# Patient Record
Sex: MALE | Race: WHITE | Employment: OTHER | URBAN - NONMETROPOLITAN AREA
[De-identification: names, ages, dates, MRNs, and addresses within clinical notes are randomized per-mention and may not be internally consistent; named-entity substitution may affect disease eponyms.]

---

## 2017-08-07 ENCOUNTER — TELEPHONE (OUTPATIENT)
Dept: CARDIOLOGY | Age: 62
End: 2017-08-07

## 2017-09-07 RX ORDER — CLINDAMYCIN HYDROCHLORIDE 300 MG/1
300 CAPSULE ORAL 3 TIMES DAILY
COMMUNITY
End: 2017-09-12 | Stop reason: ALTCHOICE

## 2017-09-12 ENCOUNTER — OFFICE VISIT (OUTPATIENT)
Dept: CARDIOLOGY | Age: 62
End: 2017-09-12
Payer: OTHER GOVERNMENT

## 2017-09-12 VITALS
HEIGHT: 73 IN | HEART RATE: 49 BPM | DIASTOLIC BLOOD PRESSURE: 80 MMHG | WEIGHT: 214 LBS | BODY MASS INDEX: 28.36 KG/M2 | SYSTOLIC BLOOD PRESSURE: 130 MMHG

## 2017-09-12 DIAGNOSIS — Q23.1 BICUSPID AORTIC VALVE: ICD-10-CM

## 2017-09-12 DIAGNOSIS — I10 ESSENTIAL HYPERTENSION: ICD-10-CM

## 2017-09-12 DIAGNOSIS — Z95.2 S/P AVR (AORTIC VALVE REPLACEMENT): Primary | ICD-10-CM

## 2017-09-12 PROCEDURE — 93000 ELECTROCARDIOGRAM COMPLETE: CPT | Performed by: CLINICAL NURSE SPECIALIST

## 2017-09-12 PROCEDURE — 99203 OFFICE O/P NEW LOW 30 MIN: CPT | Performed by: CLINICAL NURSE SPECIALIST

## 2017-09-25 ENCOUNTER — HOSPITAL ENCOUNTER (OUTPATIENT)
Dept: NON INVASIVE DIAGNOSTICS | Age: 62
Discharge: HOME | End: 2017-09-25
Payer: OTHER GOVERNMENT

## 2017-09-25 DIAGNOSIS — Q23.1 BICUSPID AORTIC VALVE: ICD-10-CM

## 2017-09-25 DIAGNOSIS — I10 ESSENTIAL HYPERTENSION: ICD-10-CM

## 2017-09-25 DIAGNOSIS — Z95.2 S/P AVR (AORTIC VALVE REPLACEMENT): ICD-10-CM

## 2017-09-25 LAB
LV EF: 58 %
LVEF MODALITY: NORMAL

## 2017-09-25 PROCEDURE — 93306 TTE W/DOPPLER COMPLETE: CPT

## 2018-03-06 ENCOUNTER — OFFICE VISIT (OUTPATIENT)
Dept: CARDIOLOGY | Age: 63
End: 2018-03-06
Payer: OTHER GOVERNMENT

## 2018-03-06 VITALS
BODY MASS INDEX: 27.83 KG/M2 | WEIGHT: 210 LBS | HEART RATE: 84 BPM | SYSTOLIC BLOOD PRESSURE: 110 MMHG | HEIGHT: 73 IN | DIASTOLIC BLOOD PRESSURE: 70 MMHG

## 2018-03-06 DIAGNOSIS — Q23.1 BICUSPID AORTIC VALVE: Primary | ICD-10-CM

## 2018-03-06 DIAGNOSIS — I48.91 NEW ONSET A-FIB (HCC): ICD-10-CM

## 2018-03-06 DIAGNOSIS — I10 ESSENTIAL HYPERTENSION: ICD-10-CM

## 2018-03-06 PROCEDURE — 93000 ELECTROCARDIOGRAM COMPLETE: CPT | Performed by: INTERNAL MEDICINE

## 2018-03-06 PROCEDURE — 99213 OFFICE O/P EST LOW 20 MIN: CPT | Performed by: INTERNAL MEDICINE

## 2018-03-06 NOTE — PROGRESS NOTES
Description if Yes       Fatigue No   Weight gain N/A   Insomnia N/A       Respiratory:        Complaint / Symptom Yes / No / Description if Yes       Cough No   Horseness N/A       Cardiovascular:    Complaint / Symptom Yes / No / Description if Yes       Chest Pain No   Shortness of Air / Orthopnea No   Presyncope / Syncope No   Palpitations No         Objective:    /70   Pulse 68   Ht 6' 1\" (1.854 m)   Wt 210 lb (95.3 kg)   BMI 27.71 kg/m²     GENERAL - well developed and well nourished, conversant  HEENT   PERRLA, Hearing appears normal  NECK - no thyromegaly, no JVD, trachea is in the midline  CARDIOVASCULAR  PMI is in the mid line clavicular position, Normal S1 and S2 with a grade 1/6 systolic murmur. No S3 or S4    PULMONARY  no respiratory distress. No wheezes or rales. Lungs are clear to ausculation   ABDOMEN   soft, non tender, no rebound  MUSCULOSKELETAL   range of motion of the upper and lower extermites appears normal and equal and is without pain   EXTREMITIES - no significant edema   NEUROLOGIC  gait and station are normal  SKIN - turgor is normal  PSYCHIATRIC - normal mood and affect, alert and orientated x 3,      ASSESSMENT:    ALL THE CARDIOLOGY PROBLEMS ARE LISTED ABOVE; HOWEVER, THE FOLLOWING SPECIFIC CARDIAC PROBLEMS / CONDITIONS WERE ADDRESSED AND TREATED DURING THE OFFICE VISIT TODAY:                                                                                            MEDICAL DECISION MAKING             Cardiac Specific Problem / Diagnosis  Discussion and Data Reviewed Diagnostic Procedures Ordered Management Options Selected           1. Bicuspid aortic valve  show no change   Review and summation of old records:Status post AVR in 2013 9/25/17 Echo normal LVFX, EF 55-60% No Continue current medications:     Yes           2. HTN  show no change   Well controlled at home.    Patient has a history of these risk factors, which are managed medically, and are on current

## 2018-04-06 ENCOUNTER — TELEPHONE (OUTPATIENT)
Dept: CARDIOLOGY | Age: 63
End: 2018-04-06

## 2018-04-19 ENCOUNTER — OFFICE VISIT (OUTPATIENT)
Dept: CARDIOLOGY | Age: 63
End: 2018-04-19
Payer: OTHER GOVERNMENT

## 2018-04-19 VITALS
WEIGHT: 215 LBS | HEIGHT: 73 IN | HEART RATE: 95 BPM | SYSTOLIC BLOOD PRESSURE: 120 MMHG | BODY MASS INDEX: 28.49 KG/M2 | DIASTOLIC BLOOD PRESSURE: 82 MMHG

## 2018-04-19 DIAGNOSIS — Z95.2 S/P AVR (AORTIC VALVE REPLACEMENT): ICD-10-CM

## 2018-04-19 DIAGNOSIS — I48.19 PERSISTENT ATRIAL FIBRILLATION (HCC): Primary | ICD-10-CM

## 2018-04-19 DIAGNOSIS — Z79.01 CHRONIC ANTICOAGULATION: ICD-10-CM

## 2018-04-19 DIAGNOSIS — I10 ESSENTIAL HYPERTENSION: ICD-10-CM

## 2018-04-19 PROCEDURE — 93000 ELECTROCARDIOGRAM COMPLETE: CPT | Performed by: NURSE PRACTITIONER

## 2018-04-19 PROCEDURE — 99214 OFFICE O/P EST MOD 30 MIN: CPT | Performed by: NURSE PRACTITIONER

## 2018-04-23 ENCOUNTER — HOSPITAL ENCOUNTER (INPATIENT)
Age: 63
LOS: 2 days | Discharge: HOME | DRG: 310 | End: 2018-04-25
Attending: INTERNAL MEDICINE | Admitting: INTERNAL MEDICINE
Payer: OTHER GOVERNMENT

## 2018-04-23 PROBLEM — I48.19 PERSISTENT ATRIAL FIBRILLATION (HCC): Status: ACTIVE | Noted: 2018-04-23

## 2018-04-23 PROCEDURE — 2140000000 HC CCU INTERMEDIATE R&B

## 2018-04-23 PROCEDURE — 6370000000 HC RX 637 (ALT 250 FOR IP): Performed by: INTERNAL MEDICINE

## 2018-04-23 PROCEDURE — 93005 ELECTROCARDIOGRAM TRACING: CPT

## 2018-04-23 RX ORDER — ASPIRIN 81 MG/1
81 TABLET, CHEWABLE ORAL DAILY
Status: DISCONTINUED | OUTPATIENT
Start: 2018-04-23 | End: 2018-04-25 | Stop reason: HOSPADM

## 2018-04-23 RX ORDER — SOTALOL HYDROCHLORIDE 80 MG/1
80 TABLET ORAL 2 TIMES DAILY
Status: DISCONTINUED | OUTPATIENT
Start: 2018-04-23 | End: 2018-04-25 | Stop reason: HOSPADM

## 2018-04-23 RX ADMIN — ASPIRIN 81 MG CHEWABLE TABLET 81 MG: 81 TABLET CHEWABLE at 20:54

## 2018-04-23 RX ADMIN — SOTALOL HYDROCHLORIDE 80 MG: 80 TABLET ORAL at 20:52

## 2018-04-23 RX ADMIN — SOTALOL HYDROCHLORIDE 80 MG: 80 TABLET ORAL at 12:51

## 2018-04-23 ASSESSMENT — PAIN SCALES - GENERAL
PAINLEVEL_OUTOF10: 0

## 2018-04-24 LAB
EKG P AXIS: NORMAL DEGREES
EKG P-R INTERVAL: NORMAL MS
EKG Q-T INTERVAL: 412 MS
EKG QRS DURATION: 96 MS
EKG QTC CALCULATION (BAZETT): 445 MS
EKG T AXIS: 42 DEGREES

## 2018-04-24 PROCEDURE — 2140000000 HC CCU INTERMEDIATE R&B

## 2018-04-24 PROCEDURE — 99233 SBSQ HOSP IP/OBS HIGH 50: CPT | Performed by: INTERNAL MEDICINE

## 2018-04-24 PROCEDURE — 6370000000 HC RX 637 (ALT 250 FOR IP): Performed by: INTERNAL MEDICINE

## 2018-04-24 RX ORDER — SODIUM CHLORIDE 0.9 % (FLUSH) 0.9 %
10 SYRINGE (ML) INJECTION EVERY 12 HOURS SCHEDULED
Status: DISCONTINUED | OUTPATIENT
Start: 2018-04-25 | End: 2018-04-25 | Stop reason: HOSPADM

## 2018-04-24 RX ORDER — SODIUM CHLORIDE 0.9 % (FLUSH) 0.9 %
10 SYRINGE (ML) INJECTION PRN
Status: DISCONTINUED | OUTPATIENT
Start: 2018-04-24 | End: 2018-04-25 | Stop reason: HOSPADM

## 2018-04-24 RX ORDER — SODIUM CHLORIDE 9 MG/ML
INJECTION, SOLUTION INTRAVENOUS CONTINUOUS
Status: DISCONTINUED | OUTPATIENT
Start: 2018-04-25 | End: 2018-04-25 | Stop reason: HOSPADM

## 2018-04-24 RX ADMIN — ASPIRIN 81 MG CHEWABLE TABLET 81 MG: 81 TABLET CHEWABLE at 09:26

## 2018-04-24 RX ADMIN — SOTALOL HYDROCHLORIDE 80 MG: 80 TABLET ORAL at 09:25

## 2018-04-24 RX ADMIN — SOTALOL HYDROCHLORIDE 80 MG: 80 TABLET ORAL at 21:33

## 2018-04-24 ASSESSMENT — PAIN SCALES - GENERAL
PAINLEVEL_OUTOF10: 0

## 2018-04-25 VITALS
RESPIRATION RATE: 16 BRPM | OXYGEN SATURATION: 96 % | DIASTOLIC BLOOD PRESSURE: 64 MMHG | SYSTOLIC BLOOD PRESSURE: 105 MMHG | WEIGHT: 212.4 LBS | HEART RATE: 52 BPM | BODY MASS INDEX: 28.15 KG/M2 | TEMPERATURE: 97 F | HEIGHT: 73 IN

## 2018-04-25 LAB
EKG P AXIS: 49 DEGREES
EKG P-R INTERVAL: 178 MS
EKG Q-T INTERVAL: 472 MS
EKG QRS DURATION: 92 MS
EKG QTC CALCULATION (BAZETT): 457 MS
EKG T AXIS: 6 DEGREES
INR BLD: 1.41 (ref 0.88–1.18)
PROTHROMBIN TIME: 17.2 SEC (ref 12–14.6)

## 2018-04-25 PROCEDURE — 36415 COLL VENOUS BLD VENIPUNCTURE: CPT

## 2018-04-25 PROCEDURE — 93005 ELECTROCARDIOGRAM TRACING: CPT

## 2018-04-25 PROCEDURE — 6370000000 HC RX 637 (ALT 250 FOR IP): Performed by: INTERNAL MEDICINE

## 2018-04-25 PROCEDURE — 92960 CARDIOVERSION ELECTRIC EXT: CPT | Performed by: INTERNAL MEDICINE

## 2018-04-25 PROCEDURE — 85610 PROTHROMBIN TIME: CPT

## 2018-04-25 PROCEDURE — 99024 POSTOP FOLLOW-UP VISIT: CPT | Performed by: INTERNAL MEDICINE

## 2018-04-25 PROCEDURE — 2580000003 HC RX 258: Performed by: INTERNAL MEDICINE

## 2018-04-25 PROCEDURE — 6360000002 HC RX W HCPCS

## 2018-04-25 PROCEDURE — 5A2204Z RESTORATION OF CARDIAC RHYTHM, SINGLE: ICD-10-PCS | Performed by: INTERNAL MEDICINE

## 2018-04-25 RX ORDER — SODIUM CHLORIDE 0.9 % (FLUSH) 0.9 %
10 SYRINGE (ML) INJECTION EVERY 12 HOURS SCHEDULED
Status: DISCONTINUED | OUTPATIENT
Start: 2018-04-25 | End: 2018-04-25 | Stop reason: HOSPADM

## 2018-04-25 RX ORDER — SOTALOL HYDROCHLORIDE 80 MG/1
80 TABLET ORAL 2 TIMES DAILY
Qty: 60 TABLET | Refills: 3 | Status: SHIPPED | OUTPATIENT
Start: 2018-04-25 | End: 2018-05-02 | Stop reason: SDUPTHER

## 2018-04-25 RX ORDER — SODIUM CHLORIDE 0.9 % (FLUSH) 0.9 %
10 SYRINGE (ML) INJECTION PRN
Status: DISCONTINUED | OUTPATIENT
Start: 2018-04-25 | End: 2018-04-25 | Stop reason: HOSPADM

## 2018-04-25 RX ADMIN — ASPIRIN 81 MG CHEWABLE TABLET 81 MG: 81 TABLET CHEWABLE at 08:46

## 2018-04-25 RX ADMIN — SODIUM CHLORIDE: 9 INJECTION, SOLUTION INTRAVENOUS at 06:41

## 2018-04-25 RX ADMIN — SOTALOL HYDROCHLORIDE 80 MG: 80 TABLET ORAL at 08:46

## 2018-04-25 ASSESSMENT — PAIN SCALES - GENERAL
PAINLEVEL_OUTOF10: 0

## 2018-05-03 RX ORDER — SOTALOL HYDROCHLORIDE 80 MG/1
80 TABLET ORAL 2 TIMES DAILY
Qty: 180 TABLET | Refills: 3 | Status: SHIPPED | OUTPATIENT
Start: 2018-05-03 | End: 2018-05-29 | Stop reason: SDUPTHER

## 2018-05-04 ENCOUNTER — OFFICE VISIT (OUTPATIENT)
Dept: CARDIOLOGY | Age: 63
End: 2018-05-04
Payer: OTHER GOVERNMENT

## 2018-05-04 VITALS
HEART RATE: 52 BPM | DIASTOLIC BLOOD PRESSURE: 68 MMHG | WEIGHT: 215 LBS | HEIGHT: 73 IN | SYSTOLIC BLOOD PRESSURE: 122 MMHG | BODY MASS INDEX: 28.49 KG/M2

## 2018-05-04 DIAGNOSIS — I48.19 PERSISTENT ATRIAL FIBRILLATION (HCC): Primary | ICD-10-CM

## 2018-05-04 PROCEDURE — 93000 ELECTROCARDIOGRAM COMPLETE: CPT | Performed by: CLINICAL NURSE SPECIALIST

## 2018-05-29 ENCOUNTER — OFFICE VISIT (OUTPATIENT)
Dept: CARDIOLOGY | Age: 63
End: 2018-05-29
Payer: OTHER GOVERNMENT

## 2018-05-29 VITALS
SYSTOLIC BLOOD PRESSURE: 128 MMHG | WEIGHT: 219 LBS | HEART RATE: 78 BPM | BODY MASS INDEX: 29.03 KG/M2 | DIASTOLIC BLOOD PRESSURE: 80 MMHG | HEIGHT: 73 IN

## 2018-05-29 DIAGNOSIS — Z95.2 S/P AVR: ICD-10-CM

## 2018-05-29 DIAGNOSIS — I48.19 PERSISTENT ATRIAL FIBRILLATION (HCC): Primary | ICD-10-CM

## 2018-05-29 DIAGNOSIS — I10 ESSENTIAL HYPERTENSION: ICD-10-CM

## 2018-05-29 PROCEDURE — 93000 ELECTROCARDIOGRAM COMPLETE: CPT | Performed by: CLINICAL NURSE SPECIALIST

## 2018-05-29 PROCEDURE — 99213 OFFICE O/P EST LOW 20 MIN: CPT | Performed by: CLINICAL NURSE SPECIALIST

## 2018-05-29 RX ORDER — SOTALOL HYDROCHLORIDE 80 MG/1
120 TABLET ORAL 2 TIMES DAILY
Qty: 180 TABLET | Refills: 3 | Status: SHIPPED | OUTPATIENT
Start: 2018-05-29 | End: 2018-06-20 | Stop reason: DRUGHIGH

## 2018-06-04 ENCOUNTER — OFFICE VISIT (OUTPATIENT)
Dept: CARDIOLOGY | Age: 63
End: 2018-06-04
Payer: OTHER GOVERNMENT

## 2018-06-04 VITALS
HEART RATE: 65 BPM | BODY MASS INDEX: 29.69 KG/M2 | SYSTOLIC BLOOD PRESSURE: 118 MMHG | WEIGHT: 224 LBS | DIASTOLIC BLOOD PRESSURE: 70 MMHG | HEIGHT: 73 IN

## 2018-06-04 DIAGNOSIS — I48.19 PERSISTENT ATRIAL FIBRILLATION (HCC): ICD-10-CM

## 2018-06-04 DIAGNOSIS — I48.19 PERSISTENT ATRIAL FIBRILLATION (HCC): Primary | ICD-10-CM

## 2018-06-04 DIAGNOSIS — Z95.2 S/P AVR: ICD-10-CM

## 2018-06-04 DIAGNOSIS — I10 ESSENTIAL HYPERTENSION: ICD-10-CM

## 2018-06-04 DIAGNOSIS — Q23.1 BICUSPID AORTIC VALVE: ICD-10-CM

## 2018-06-04 LAB
ANION GAP SERPL CALCULATED.3IONS-SCNC: 17 MMOL/L (ref 7–19)
BUN BLDV-MCNC: 17 MG/DL (ref 8–23)
CALCIUM SERPL-MCNC: 9.2 MG/DL (ref 8.8–10.2)
CHLORIDE BLD-SCNC: 101 MMOL/L (ref 98–111)
CO2: 24 MMOL/L (ref 22–29)
CREAT SERPL-MCNC: 0.8 MG/DL (ref 0.5–1.2)
GFR NON-AFRICAN AMERICAN: >60
GLUCOSE BLD-MCNC: 86 MG/DL (ref 74–109)
POTASSIUM SERPL-SCNC: 4.2 MMOL/L (ref 3.5–5)
SODIUM BLD-SCNC: 142 MMOL/L (ref 136–145)

## 2018-06-04 PROCEDURE — 93000 ELECTROCARDIOGRAM COMPLETE: CPT | Performed by: CLINICAL NURSE SPECIALIST

## 2018-06-04 PROCEDURE — 99213 OFFICE O/P EST LOW 20 MIN: CPT | Performed by: CLINICAL NURSE SPECIALIST

## 2018-06-04 ASSESSMENT — ENCOUNTER SYMPTOMS
SHORTNESS OF BREATH: 0
BLURRED VISION: 0
VOMITING: 0
ORTHOPNEA: 0
COUGH: 0
HEARTBURN: 0
NAUSEA: 0

## 2018-06-08 ENCOUNTER — TELEPHONE (OUTPATIENT)
Dept: CARDIOLOGY | Age: 63
End: 2018-06-08

## 2018-06-08 ENCOUNTER — HOSPITAL ENCOUNTER (OUTPATIENT)
Dept: CARDIAC CATH/INVASIVE PROCEDURES | Age: 63
Discharge: HOME | End: 2018-06-08
Attending: INTERNAL MEDICINE | Admitting: INTERNAL MEDICINE
Payer: OTHER GOVERNMENT

## 2018-06-08 VITALS
RESPIRATION RATE: 13 BRPM | SYSTOLIC BLOOD PRESSURE: 110 MMHG | WEIGHT: 215 LBS | HEART RATE: 50 BPM | HEIGHT: 73 IN | OXYGEN SATURATION: 98 % | DIASTOLIC BLOOD PRESSURE: 67 MMHG | TEMPERATURE: 98 F | BODY MASS INDEX: 28.49 KG/M2

## 2018-06-08 PROCEDURE — 99024 POSTOP FOLLOW-UP VISIT: CPT | Performed by: INTERNAL MEDICINE

## 2018-06-08 PROCEDURE — 92960 CARDIOVERSION ELECTRIC EXT: CPT | Performed by: INTERNAL MEDICINE

## 2018-06-08 PROCEDURE — 93005 ELECTROCARDIOGRAM TRACING: CPT

## 2018-06-08 PROCEDURE — 2580000003 HC RX 258: Performed by: INTERNAL MEDICINE

## 2018-06-08 PROCEDURE — 6360000002 HC RX W HCPCS

## 2018-06-08 RX ORDER — SODIUM CHLORIDE 0.9 % (FLUSH) 0.9 %
10 SYRINGE (ML) INJECTION PRN
Status: CANCELLED | OUTPATIENT
Start: 2018-06-08

## 2018-06-08 RX ORDER — SODIUM CHLORIDE 9 MG/ML
INJECTION, SOLUTION INTRAVENOUS CONTINUOUS
Status: DISCONTINUED | OUTPATIENT
Start: 2018-06-08 | End: 2018-06-08 | Stop reason: HOSPADM

## 2018-06-08 RX ORDER — SODIUM CHLORIDE 0.9 % (FLUSH) 0.9 %
10 SYRINGE (ML) INJECTION EVERY 12 HOURS SCHEDULED
Status: CANCELLED | OUTPATIENT
Start: 2018-06-08

## 2018-06-08 RX ADMIN — SODIUM CHLORIDE: 9 INJECTION, SOLUTION INTRAVENOUS at 08:07

## 2018-06-09 LAB
EKG P AXIS: 47 DEGREES
EKG P AXIS: NORMAL DEGREES
EKG P-R INTERVAL: 182 MS
EKG P-R INTERVAL: NORMAL MS
EKG Q-T INTERVAL: 448 MS
EKG Q-T INTERVAL: 500 MS
EKG QRS DURATION: 92 MS
EKG QRS DURATION: 98 MS
EKG QTC CALCULATION (BAZETT): 448 MS
EKG QTC CALCULATION (BAZETT): 479 MS
EKG T AXIS: 28 DEGREES
EKG T AXIS: 36 DEGREES

## 2018-06-20 ENCOUNTER — OFFICE VISIT (OUTPATIENT)
Dept: CARDIOLOGY | Age: 63
End: 2018-06-20
Payer: OTHER GOVERNMENT

## 2018-06-20 VITALS
SYSTOLIC BLOOD PRESSURE: 124 MMHG | HEIGHT: 73 IN | DIASTOLIC BLOOD PRESSURE: 84 MMHG | WEIGHT: 221 LBS | BODY MASS INDEX: 29.29 KG/M2 | HEART RATE: 78 BPM

## 2018-06-20 DIAGNOSIS — I48.19 PERSISTENT ATRIAL FIBRILLATION (HCC): ICD-10-CM

## 2018-06-20 DIAGNOSIS — I48.91 ATRIAL FIBRILLATION, UNSPECIFIED TYPE (HCC): Primary | ICD-10-CM

## 2018-06-20 DIAGNOSIS — Z79.01 CHRONIC ANTICOAGULATION: ICD-10-CM

## 2018-06-20 DIAGNOSIS — Z95.2 S/P AVR: ICD-10-CM

## 2018-06-20 PROCEDURE — 93000 ELECTROCARDIOGRAM COMPLETE: CPT | Performed by: NURSE PRACTITIONER

## 2018-06-20 PROCEDURE — 99214 OFFICE O/P EST MOD 30 MIN: CPT | Performed by: NURSE PRACTITIONER

## 2018-06-20 RX ORDER — SOTALOL HYDROCHLORIDE 80 MG/1
120 TABLET ORAL 2 TIMES DAILY
Qty: 180 TABLET | Refills: 3 | Status: SHIPPED | OUTPATIENT
Start: 2018-06-20 | End: 2018-06-25 | Stop reason: DRUGHIGH

## 2018-06-20 RX ORDER — SOTALOL HYDROCHLORIDE 160 MG/1
160 TABLET ORAL 2 TIMES DAILY
Qty: 60 TABLET | Refills: 5 | Status: SHIPPED | OUTPATIENT
Start: 2018-06-20 | End: 2018-06-20

## 2018-06-25 ENCOUNTER — OFFICE VISIT (OUTPATIENT)
Dept: CARDIOLOGY | Age: 63
End: 2018-06-25
Payer: OTHER GOVERNMENT

## 2018-06-25 VITALS
WEIGHT: 221 LBS | SYSTOLIC BLOOD PRESSURE: 106 MMHG | BODY MASS INDEX: 29.29 KG/M2 | HEIGHT: 73 IN | DIASTOLIC BLOOD PRESSURE: 72 MMHG | HEART RATE: 78 BPM

## 2018-06-25 DIAGNOSIS — Z79.01 CHRONIC ANTICOAGULATION: ICD-10-CM

## 2018-06-25 DIAGNOSIS — I48.19 PERSISTENT ATRIAL FIBRILLATION (HCC): ICD-10-CM

## 2018-06-25 DIAGNOSIS — I48.19 PERSISTENT ATRIAL FIBRILLATION (HCC): Primary | ICD-10-CM

## 2018-06-25 DIAGNOSIS — Z95.2 S/P AVR: ICD-10-CM

## 2018-06-25 LAB
ANION GAP SERPL CALCULATED.3IONS-SCNC: 14 MMOL/L (ref 7–19)
BUN BLDV-MCNC: 18 MG/DL (ref 8–23)
CALCIUM SERPL-MCNC: 9.4 MG/DL (ref 8.8–10.2)
CHLORIDE BLD-SCNC: 102 MMOL/L (ref 98–111)
CO2: 25 MMOL/L (ref 22–29)
CREAT SERPL-MCNC: 0.9 MG/DL (ref 0.5–1.2)
GFR NON-AFRICAN AMERICAN: >60
GLUCOSE BLD-MCNC: 96 MG/DL (ref 74–109)
POTASSIUM SERPL-SCNC: 4.4 MMOL/L (ref 3.5–5)
SODIUM BLD-SCNC: 141 MMOL/L (ref 136–145)

## 2018-06-25 PROCEDURE — 99214 OFFICE O/P EST MOD 30 MIN: CPT | Performed by: NURSE PRACTITIONER

## 2018-06-25 PROCEDURE — 93000 ELECTROCARDIOGRAM COMPLETE: CPT | Performed by: NURSE PRACTITIONER

## 2018-06-25 RX ORDER — SOTALOL HYDROCHLORIDE 80 MG/1
160 TABLET ORAL 2 TIMES DAILY
COMMUNITY
End: 2018-07-24 | Stop reason: DRUGHIGH

## 2018-07-02 ENCOUNTER — HOSPITAL ENCOUNTER (OUTPATIENT)
Dept: CARDIAC CATH/INVASIVE PROCEDURES | Age: 63
Discharge: HOME | End: 2018-07-02
Attending: INTERNAL MEDICINE | Admitting: INTERNAL MEDICINE
Payer: OTHER GOVERNMENT

## 2018-07-02 VITALS
DIASTOLIC BLOOD PRESSURE: 60 MMHG | TEMPERATURE: 98.1 F | WEIGHT: 221 LBS | HEIGHT: 73 IN | BODY MASS INDEX: 29.29 KG/M2 | RESPIRATION RATE: 14 BRPM | HEART RATE: 48 BPM | SYSTOLIC BLOOD PRESSURE: 108 MMHG | OXYGEN SATURATION: 98 %

## 2018-07-02 LAB
ANION GAP SERPL CALCULATED.3IONS-SCNC: 10 MMOL/L (ref 7–19)
BUN BLDV-MCNC: 22 MG/DL (ref 8–23)
CALCIUM SERPL-MCNC: 8.9 MG/DL (ref 8.8–10.2)
CHLORIDE BLD-SCNC: 107 MMOL/L (ref 98–111)
CO2: 26 MMOL/L (ref 22–29)
CREAT SERPL-MCNC: 0.9 MG/DL (ref 0.5–1.2)
GFR NON-AFRICAN AMERICAN: >60
GLUCOSE BLD-MCNC: 98 MG/DL (ref 74–109)
POTASSIUM SERPL-SCNC: 4.3 MMOL/L (ref 3.5–5)
SODIUM BLD-SCNC: 143 MMOL/L (ref 136–145)

## 2018-07-02 PROCEDURE — 92960 CARDIOVERSION ELECTRIC EXT: CPT | Performed by: INTERNAL MEDICINE

## 2018-07-02 PROCEDURE — 6360000002 HC RX W HCPCS

## 2018-07-02 PROCEDURE — 36415 COLL VENOUS BLD VENIPUNCTURE: CPT

## 2018-07-02 PROCEDURE — 93005 ELECTROCARDIOGRAM TRACING: CPT

## 2018-07-02 PROCEDURE — 2580000003 HC RX 258: Performed by: INTERNAL MEDICINE

## 2018-07-02 PROCEDURE — 99024 POSTOP FOLLOW-UP VISIT: CPT | Performed by: INTERNAL MEDICINE

## 2018-07-02 PROCEDURE — 80048 BASIC METABOLIC PNL TOTAL CA: CPT

## 2018-07-02 RX ORDER — SODIUM CHLORIDE 9 MG/ML
INJECTION, SOLUTION INTRAVENOUS CONTINUOUS
Status: DISCONTINUED | OUTPATIENT
Start: 2018-07-02 | End: 2018-07-03 | Stop reason: HOSPADM

## 2018-07-02 RX ADMIN — SODIUM CHLORIDE: 9 INJECTION, SOLUTION INTRAVENOUS at 11:08

## 2018-07-03 NOTE — H&P
ANNA Nieto Nurse Practitioner Signed   Progress Notes Encounter Date: 2018   Related encounter: Office Visit from 2018 in [facility]       Expand All Collapse All    []Manual[]Template  []Copied  [facility] [place], [place]. [place] [address] [address] [facility]  [phone] office  [phone] fax        OFFICE VISIT:  2018     Óscar Santana - : 1955     Reason For Visit:  Jose Barron is a 65 y.o. male who is here for Follow-up (Patient presents for EKG after Sotalol increased to 160 mg twice daily for recurrent AF.); Atrial Fibrillation; and Cardiac Valve Problem (Hx bicuspid AVR - s/p pericardial AVR)     The patient presents today for cardiology follow up regarding recurrent AF after DCCV on 18. The patient was seen last week and Sotalol was increased to 160 mg twice daily. He has tolerated increase without adverse effect. The patient is on Xarelto without bleeding issues. Overall, the patient is doing well from a cardiac standpoint without symptoms to suggest myocardial ischemia. BP is normal.  The patient's PCP monitors cholesterol.       Subjective  Jose Barron denies exertional chest pain, shortness of breath, orthopnea, paroxysmal nocturnal dyspnea, syncope, presyncope,  edema and fatigue. The patient denies numbness or weakness to suggest cerebrovascular accident or transient ischemic attack.   + arrhyhtmia.     Óscar Santana has the following history as recorded in [facility]:          Patient Active Problem List   Diagnosis Code    HTN (hypertension) I10    Bicuspid aortic valve Q23.1    Persistent atrial fibrillation (HCC) I48.1    S/P AVR Z95.2    Atrial fibrillation (HCC) I48.91    Regular pulse rhythm Z78.9    History of cardioversion Z98.890    Chronic anticoagulation Z79.01      Past Medical History        Past Medical History:   Diagnosis Date    Arrhythmia      orthopnea or PND. No occurrence of slow heart rate. No palpitations. No claudication. No leg edema. + arrhythmia. Gastrointestinal - no abdominal swelling or pain. No blood in stool. No severe constipation, diarrhea, nausea, or vomiting. Genitourinary - no dysuria, frequency, or urgency. No flank pain or hematuria. Musculoskeletal - no back pain or myalgia. No problems with gait. Extremities - no clubbing, cyanosis or edema. Skin - no color change or rash. No pallor. No new surgical incision. Neurologic - no speech difficulty, facial asymmetry or lateralizing weakness. No seizures, presyncope or syncope. No significant dizziness. Hematologic - no easy bruising or excessive bleeding. Psychiatric - no severe anxiety or insomnia. No confusion. All other review of systems are negative.     Objective  Vital Signs - /72   Pulse 78   Ht 6' 1\" (1.854 m)   Wt 221 lb (100.2 kg)   BMI 29.16 kg/m²   General - [facility] is alert, cooperative, and pleasant. Well groomed. No acute distress. Body habitus - Body mass index is 29.16 kg/m². HEENT - Head is normocephalic. No circumoral cyanosis. Dentition is normal.  EYES -   Lids normal without ptosis. No discharge, edema or subconjunctival hemorrhage. Neck - Symmetrical without apparent mass or lymphadenopathy. Respiratory - Normal respiratory effort without use of accessory muscles. Ausculatation reveals vesicular breath sounds without crackles, wheezes, rub or rhonchi. Cardiovascular - No jugular venous distention. Auscultation reveals irregularly irregular rate and rhythm. No audible clicks, gallop or rub. No murmur. No lower extremity varicosities. No carotid bruits. Abdominal -  No visible distention, mass or pulsations. Extremities - No clubbing or cyanosis. No statis dermatitis or ulcers. No edema. Musculoskeletal -   No Osler's nodes. No kyphosis or scoliosis. Gait is even and regular without limp or shuffle. Result Value Ref Range     P-R Interval 182 ms     QRS Duration 98 ms     Q-T Interval 500 ms     QTc Calculation (Bazett) 479 ms     P Axis 47 degrees     T Axis 36 degrees   Basic Metabolic Panel     Collection Time: 06/25/18 12:32 PM   Result Value Ref Range     Sodium 141 136 - 145 mmol/L     Potassium 4.4 3.5 - 5.0 mmol/L     Chloride 102 98 - 111 mmol/L     CO2 25 22 - 29 mmol/L     Anion Gap 14 7 - 19 mmol/L     Glucose 96 74 - 109 mg/dL     BUN 18 8 - 23 mg/dL     CREATININE 0.9 0.5 - 1.2 mg/dL     GFR Non- >60 >60     Calcium 9.4 8.8 - 10.2 mg/dL         Plan  Previous cardiac history and records reviewed. Continue current medications as prescribed. Outpatient DCCV scheduled. BMP today. Continue to follow up with primary care provider for non cardiac medical problems. Call the office with any problems, questions or concerns at [phone]. Follow up as scheduled with your cardiologist - as scheduled after cardioversion. The following educational material has been included in this after visit summary for your review: atrial fibrillation, cardioversion, Xarelto.     Additional instructions:  Xarelto can increase your risk of bleeding. If you notice blood in urine or stool, bleeding gums, excessive bruising or cough productive of bloody sputum, notify the office. Information on this blood thinner has been included in your after visit summary. Coronary artery disease risk factors you can control: Smoking, high blood pressure, high cholesterol, diabetes, being overweight, lack of exercise and stress. Continue heart healthy diet. Take medications as directed. Exercise as tolerated. Strive for 15 minutes of exercise most days of the week. If asked to keep a blood pressure log, do so for 2 weeks. Call the office to report readings at [phone]. Blood pressure goal is 140/90 or less. If you are a diabetic, the goal is 130/80 or less.   If you are taking cholesterol lowering medications, it is recommended that lab work be checked annually. Always keep a current medication list. Bring your medications to every office visit.      Out-Patient -  Cardioversion    [place] at the [facility] and [address] [facility] located on the first floor of [facility].     Date/Time: Monday July 2, 2018 - check in at 10:00 am.  Order:  BMP  (Basic metabolic profile). If on Coumadin patient will need a PT/INR. Cardioversion is a procedure that uses an electrical current to help normalize the heart rhythm. You will be coming into our facility as an outpatient and will go home following this procedure. Cardioversion Instructions:  · Do not eat or drink anything after midnight the night before your procedure. May take morning medications with a sip of water unless otherwise directed not to. · You should arrange to have someone take you home rather than drive yourself. · Further plans will depend upon the result of your procedure. If for any reason you are unable to keep this appointment, please contact [facility], [phone], as soon as possible to reschedule.      Ebenezer Block, ANNA          7/3/2018       Proposed Procedure:  DCCV    :  RAUL Fenton MD    Indications:  Recurrent AF    I have discussed the risks, benefits and options with the patient and his family. They appear to understand, have no questions, and wish to proceed. This procedure is scheduled for today.       Electronically signed by Navin Saldivar MD on 7/3/18

## 2018-07-03 NOTE — PROCEDURES
[facility]    Direct current cardioversion        7/3/2018     Indications:  Atrial fibrillation with an appropriate duration of anticoagulation    After obtaining informed written consent and an appropriate level of conscious sedation, DCCV with 360 J was successful in restoring sinus rhythm. Complications:  none      Impression:  Successful DC cardioversion of atrial fibrillation to normal sinus rhythm on Betapace 160 mg orally twice a day.     Electronically signed by Navin Saldivar MD on 7/3/18

## 2018-07-03 NOTE — DISCHARGE SUMMARY
[facility]    Discharge Summary      I personally saw the patient and rounded with:  Tami Yepez, nurse in cath holding      Patient ID: Valentin Langston      Patient's PCP: Johan Dolan    Admit Date: 7/2/2018     Discharge Date:  07/03/18     Admitting Physician:  Azul Petersen MD      Discharge Physician: Azul Petersen MD     Discharge Diagnoses:    1. Paroxymal atrial fibrillation, discharged in normal sinus rhythm after successful DCCV on Betapace 160 mg orally twice a day    4/25/18  DCCV successful on betapace 80 bid  6/8/18  DCCV successful on betapace 120 bid  7/2/2018  DCCV successful on betapace 160 bid    2. Valvular heart disease    Status post AVR in 2013 9/25/17 Echo normal LVFX, EF 55-60%    3. Hypertension        Cardiac Specific Diagnoses:    Specialty Problems        Cardiology Problems    Atrial fibrillation (HCC)        HTN (hypertension)        Bicuspid aortic valve        Persistent atrial fibrillation (HCC)                The patient was seen and examined on day of discharge and this discharge summary is in conjunction with any daily progress note from day of discharge. History of Present Illness: The patient was seen in the office on 6/25/2018 and the following was noted: \"Jatinder is a 65 y. o. male who is here for Follow-up (Patient presents for EKG after Sotalol increased to 160 mg twice daily for recurrent AF.); Atrial Fibrillation; and Cardiac Valve Problem (Hx bicuspid AVR - s/p pericardial AVR)     The patient presents today for cardiology follow up regarding recurrent AF after DCCV on 6/8/18.  The patient was seen last week and Sotalol was increased to 160 mg twice daily. He has tolerated increase without adverse effect.  The patient is on Xarelto without bleeding issues.  Overall, the patient is doing well from a cardiac standpoint without symptoms to suggest myocardial ischemia.  BP is normal.  The patient's PCP monitors cholesterol.       Subjective  Jatinder denies exertional chest pain, shortness of breath, orthopnea, paroxysmal nocturnal dyspnea, syncope, presyncope,  edema and fatigue.  The patient denies numbness or weakness to suggest cerebrovascular accident or transient ischemic attack.  + arrhyhtmia. \"    \"EKG reviewed:  NSR 78 BPM;  No acute ischemic changes; QTc .431. Patient scheduled for outpatient DCCV on 7/2/18 arrival time 10:00 am.  BMP checked today. Procedure instructions reviewed with understanding verbalized. Tolerating Sotalol 160 mg twice daily and Xarelto 20 mg daily with food. \"     He was electively admitted for DCCV. Hospital Course:     After admission, there was no enzymatic or electrocardiographic evidence of myocardial ischemia.      The patient was placed on Betapace 160 mg orally twice a day.  Xarelto was used for anticoagulation.      After an appropriate loading dose, and after obtaining informed written consent and an appropriate level of conscious sedation, DCCV with 360 J was successful in restoring sinus rhythm.      There were no apparent complications. The rest of the patient's hospitalization was uneventful. He was discharged home on medical therapy with outpatient follow up. Consults:     None      Significant Diagnostic Studies:    1. none    Significant Therapeutic Endeavors:      1. DCCV, 7/2/2018      Activity: activity as directed per discharge instructions. Diet:  Cardiac diet    Labs:  For convenience and continuity at follow-up the following most recent labs are provided:    CBC:  No results found for: WBC, HGB, HCT, PLT    Renal:    Lab Results   Component Value Date     07/02/2018    K 4.3 07/02/2018     07/02/2018    CO2 26 07/02/2018    BUN 22 07/02/2018    CREATININE 0.9 07/02/2018    CALCIUM 8.9 07/02/2018         Discharge Medications:     Discharge Medication List as of 7/2/2018  3:23 PM           Details   sotalol (BETAPACE) 80 MG tablet 160 mg 2 times daily Take 2 tablet twice daily Historical Med      aspirin 81 MG tablet Take 81 mg by mouth dailyHistorical Med      rivaroxaban (XARELTO) 20 MG TABS tablet Take 1 tablet by mouth Daily with supper, Disp-90 tablet, R-3Normal                 Disposition:      1. Home  2. Follow up with cardiology as arranged  3.  Follow up with primary care provider as arranged      Electronically signed by Maximilian Mcdonough MD on 7/3/18

## 2018-07-04 LAB
EKG P AXIS: 64 DEGREES
EKG P AXIS: NORMAL DEGREES
EKG P-R INTERVAL: 176 MS
EKG P-R INTERVAL: NORMAL MS
EKG Q-T INTERVAL: 496 MS
EKG Q-T INTERVAL: 528 MS
EKG QRS DURATION: 94 MS
EKG QRS DURATION: 94 MS
EKG QTC CALCULATION (BAZETT): 491 MS
EKG QTC CALCULATION (BAZETT): 504 MS
EKG T AXIS: -28 DEGREES
EKG T AXIS: 7 DEGREES

## 2018-07-24 ENCOUNTER — OFFICE VISIT (OUTPATIENT)
Dept: CARDIOLOGY | Age: 63
End: 2018-07-24
Payer: OTHER GOVERNMENT

## 2018-07-24 VITALS
WEIGHT: 226 LBS | HEART RATE: 48 BPM | BODY MASS INDEX: 29.95 KG/M2 | HEIGHT: 73 IN | DIASTOLIC BLOOD PRESSURE: 62 MMHG | SYSTOLIC BLOOD PRESSURE: 112 MMHG

## 2018-07-24 DIAGNOSIS — I10 ESSENTIAL HYPERTENSION: ICD-10-CM

## 2018-07-24 DIAGNOSIS — Q23.1 BICUSPID AORTIC VALVE: ICD-10-CM

## 2018-07-24 DIAGNOSIS — Z95.2 S/P AVR: ICD-10-CM

## 2018-07-24 DIAGNOSIS — I48.0 PAF (PAROXYSMAL ATRIAL FIBRILLATION) (HCC): Primary | ICD-10-CM

## 2018-07-24 DIAGNOSIS — R06.02 SHORTNESS OF BREATH: ICD-10-CM

## 2018-07-24 PROCEDURE — 99213 OFFICE O/P EST LOW 20 MIN: CPT | Performed by: CLINICAL NURSE SPECIALIST

## 2018-07-24 PROCEDURE — 93000 ELECTROCARDIOGRAM COMPLETE: CPT | Performed by: CLINICAL NURSE SPECIALIST

## 2018-07-24 RX ORDER — SOTALOL HYDROCHLORIDE 160 MG/1
160 TABLET ORAL 2 TIMES DAILY
COMMUNITY
End: 2018-10-16 | Stop reason: SDUPTHER

## 2018-07-24 RX ORDER — SOTALOL HYDROCHLORIDE 80 MG/1
80 TABLET ORAL 2 TIMES DAILY
COMMUNITY
End: 2018-07-24

## 2018-07-24 ASSESSMENT — ENCOUNTER SYMPTOMS
HEARTBURN: 0
BLOOD IN STOOL: 0
COUGH: 0
BLURRED VISION: 0
VOMITING: 0
SHORTNESS OF BREATH: 0
NAUSEA: 0
ORTHOPNEA: 0

## 2018-07-24 NOTE — PATIENT INSTRUCTIONS
[place] at the [address] [facility] and [address] [facility] located on the first floor of [place] [address] through hospital main entrance and turn immediately to your left. Patient's contact number:  [phone] (home)      Lexiscan Stress Test      Lexiscan (regadenoson injection) is a prescription drug given through an IV line that increases blood flow through the arteries of the heart during a cardiac nuclear stress test.     There are two parts to a Lexiscan stress test: the rest portion and the exercise portion. For the rest portion, a radioactive tracer is injected into your arm through the IV. After 30 to 60 minutes, the process of imaging will begin. A nuclear camera will be placed on your chest area and images are taken for the next 15 to 20 minutes. For the exercise portion, a nurse will attach EKG electrodes to your chest to monitor your heart rate. The drug Radha Hare is administered to simulate stress on the heart. Your heart rhythm will then be monitored for the next few minutes. Your blood pressure will also be monitored throughout the exercise portion. [place] through the exercise portion, a second round of radioactive tracer is injected into your body. Your heart rate and EKG will be monitored for another few minutes after administering the drug. Test Preparation:     Bring a list of your current medications. Do not take any of your medications the morning of the test, but bring all morning medications with you as you will take them after the stress portion of the test is completed.  Do not eat Bananas 24 hours prior to test.     No caffeine 24 hours prior to the testing. This includes: coffee, pop/soda, chocolate, cold medications, etc.  Any product that might contain caffeine.  No nicotine or alcohol 12 hours prior to your test.    Nothing to eat or drink 4-6 hours prior to appointment time.   It is okay to drink small amounts of water during the four hours prior to the test.   Nitroglycerin patches must be taken off 1 hour before testing.  Wear comfortable clothing.  Please refrain from any strenuous exercise or activities the day before your test, or the day of your test.   The Nuclear Lexiscan Stress test takes about 2 ½ to 3 hours to complete. If for any reason you are unable to keep this appointment, please contact Outpatient Scheduling, [phone], as soon as possible to reschedule.

## 2018-07-24 NOTE — PROGRESS NOTES
[facility], [address], [address]  [address]  Phone: [phone]  Fax: [phone]    OFFICE VISIT:  2018    Khang Wick Rushing - : 1955    Reason For Visit:  Errol Cowden is a 65 y.o. male who is here for hospital follow-up after recent cardioversion for atrial fibrillation    HPI   She is here for hospital follow-up after recent cardioversion. 18  DCCV successful on betapace 80 bid  18  DCCV successful on betapace 120 bid  2018  DCCV successful on betapace 160 bid  He is maintaining sinus rhythm. He has history of valvular heart disease the history of an aortic valve replacement. He states he is feeling well. He denies any chest pain, dyspnea, orthopnea, PND, edema, palpitations. There is no dizziness or lightheadedness with his lower heart rate    Meagan Barreto is PCP.   Josh Beth has the following history as [facility] in [facility]:    Patient Active Problem List    Diagnosis Date Noted    PAF (paroxysmal atrial fibrillation) ([facility]      Priority: High    Regular pulse rhythm      Priority: High    History of cardioversion      Priority: High    Chronic anticoagulation 2018    Persistent atrial fibrillation ([address] 2018    HTN (hypertension) 2017    Bicuspid aortic valve     S/P AVR 2013     Past Medical History:   Diagnosis Date    Arrhythmia     Atrial fibrillation ([address]     Bicuspid aortic valve     Status post AVR in     Cancer ([address]     Basal cell carcinoma on chest    Osteoarthritis     S/P AVR     Porcine     Past Surgical History:   Procedure Laterality Date    AORTIC VALVE REPLACEMENT  2013    Porcine valve replacement with fernando-arch replacement    CARDIAC SURGERY  2013    Aortic Valve Replacement    CARDIOVERSION      COLONOSCOPY  2017    DIAGNOSTIC CARDIAC CATH LAB PROCEDURE      JOINT REPLACEMENT Right     knee     Family History   Problem Relation Age of Onset    Heart sounds and intact distal pulses. Exam reveals no gallop and no friction rub. No murmur heard. No carotid bruit   Pulmonary/Chest: Effort normal and breath sounds normal. No respiratory distress. He has no wheezes. He has no rales. Abdominal: Soft. There is no tenderness. Musculoskeletal: He exhibits no edema. Normal gait and station   Neurological: He is alert and oriented to person, place, and time. No cranial nerve deficit. Skin: Skin is warm and dry. No rash noted. Psychiatric: He has a normal mood and affect. His behavior is normal. Judgment normal.   Nursing note and vitals reviewed. Assessment:     Diagnosis Orders   1. PAF (paroxysmal atrial fibrillation) ([facility])  EKG 12 lead    NM MYOCARDIAL SPECT REST EXERCISE OR RX   2. Shortness of breath  NM MYOCARDIAL SPECT REST EXERCISE OR RX   3. Essential hypertension     4. Bicuspid aortic valve     5. S/P AVR       EKG shows sinus bradycardia at rate 48 with a QTc interval of 0.457 ms    Patient is maintaining normal sinus rhythm with increased dosage of sotalol. He remains anticoagulated without bleeding issues. He has not yet had an ischemic workup with his recent bouts of A. fib. Plan will be to check a Lexiscan nuclear stress test. With his valvular heart disease, his last echo was last year which showed a normal functioning valve    Stable cardiovascular status. No evidence of overt heart failure, angina or dysrhythmia.      Plan    Orders Placed This Encounter   Procedures    NM MYOCARDIAL SPECT REST EXERCISE OR RX     With myocardial perfusion study with sestamibi     Standing Status:   Future     Standing Expiration Date:   7/24/2019     Order Specific Question:   Reason for Exam?     Answer:   Shortness of breath     Order Specific Question:   Procedure Type     Answer:   Rx     Order Specific Question:   Reason for exam:     Answer:   paroxysmal atrial fib    EKG 12 lead     Order Specific Question:   Reason for Exam?     Answer:

## 2018-08-09 ENCOUNTER — HOSPITAL ENCOUNTER (OUTPATIENT)
Dept: NUCLEAR MEDICINE | Age: 63
Discharge: HOME | End: 2018-08-11
Payer: OTHER GOVERNMENT

## 2018-08-09 ENCOUNTER — HOSPITAL ENCOUNTER (OUTPATIENT)
Dept: NON INVASIVE DIAGNOSTICS | Age: 63
Discharge: HOME | End: 2018-08-09
Payer: OTHER GOVERNMENT

## 2018-08-09 DIAGNOSIS — R06.02 SHORTNESS OF BREATH: ICD-10-CM

## 2018-08-09 DIAGNOSIS — I48.0 PAF (PAROXYSMAL ATRIAL FIBRILLATION) (HCC): ICD-10-CM

## 2018-08-09 PROCEDURE — 6360000002 HC RX W HCPCS: Performed by: INTERNAL MEDICINE

## 2018-08-09 PROCEDURE — 3430000000 HC RX DIAGNOSTIC RADIOPHARMACEUTICAL: Performed by: INTERNAL MEDICINE

## 2018-08-09 PROCEDURE — 78452 HT MUSCLE IMAGE SPECT MULT: CPT

## 2018-08-09 PROCEDURE — A9500 TC99M SESTAMIBI: HCPCS | Performed by: INTERNAL MEDICINE

## 2018-08-09 PROCEDURE — 93017 CV STRESS TEST TRACING ONLY: CPT

## 2018-08-09 RX ADMIN — TETRAKIS(2-METHOXYISOBUTYLISOCYANIDE)COPPER(I) TETRAFLUOROBORATE 30 MILLICURIE: 1 INJECTION, POWDER, LYOPHILIZED, FOR SOLUTION INTRAVENOUS at 11:31

## 2018-08-09 RX ADMIN — REGADENOSON 0.4 MG: 0.08 INJECTION, SOLUTION INTRAVENOUS at 11:31

## 2018-08-09 RX ADMIN — TETRAKIS(2-METHOXYISOBUTYLISOCYANIDE)COPPER(I) TETRAFLUOROBORATE 10 MILLICURIE: 1 INJECTION, POWDER, LYOPHILIZED, FOR SOLUTION INTRAVENOUS at 11:29

## 2018-08-10 LAB
LV EF: 53 %
LVEF MODALITY: NORMAL

## 2018-10-16 ENCOUNTER — OFFICE VISIT (OUTPATIENT)
Dept: CARDIOLOGY | Age: 63
End: 2018-10-16
Payer: OTHER GOVERNMENT

## 2018-10-16 VITALS
HEIGHT: 73 IN | SYSTOLIC BLOOD PRESSURE: 138 MMHG | DIASTOLIC BLOOD PRESSURE: 78 MMHG | WEIGHT: 233 LBS | HEART RATE: 48 BPM | BODY MASS INDEX: 30.88 KG/M2

## 2018-10-16 DIAGNOSIS — I48.0 PAF (PAROXYSMAL ATRIAL FIBRILLATION) (HCC): Primary | ICD-10-CM

## 2018-10-16 DIAGNOSIS — I10 ESSENTIAL HYPERTENSION: ICD-10-CM

## 2018-10-16 PROCEDURE — 99212 OFFICE O/P EST SF 10 MIN: CPT | Performed by: INTERNAL MEDICINE

## 2018-10-16 RX ORDER — SOTALOL HYDROCHLORIDE 160 MG/1
160 TABLET ORAL 2 TIMES DAILY
Qty: 180 TABLET | Refills: 3 | Status: SHIPPED | OUTPATIENT
Start: 2018-10-16 | End: 2019-09-06 | Stop reason: SDUPTHER

## 2018-10-16 NOTE — PROGRESS NOTES
[facility] Patient Office Visit    [facility]  [address]  Phone: [phone]  Fax: [phone]        10/16/2018    Chief Complaint / Reason for the Visit   Follow up of:  Atrial Fibrillation and HTN       Specialty Problems        Cardiology Problems    PAF (paroxysmal atrial fibrillation) ([facility]        Bicuspid aortic valve        HTN (hypertension)        Persistent atrial fibrillation ([facility]              Current Status Today According to the patient:  \"I'm doing alright as far as I know, I never know when I\"m out of rhythm \"    Subjective:  Mr. Tammy Gomez is generally feeling stable. Mr. Tammy Gomez has the following cardiac complaints / symptoms today:    1. Atrial Fibrillation, doing well on Sotalol 160 mg BID    2. HTN, Is stable on current medications             Tammy Gomez is a 59 y.o. male with the following history as recorded in [facility]:    Patient Active Problem List    Diagnosis Date Noted    PAF (paroxysmal atrial fibrillation) ([facility]      Priority: High    Regular pulse rhythm      Priority: High    History of cardioversion      Priority: High    Chronic anticoagulation 06/20/2018    Persistent atrial fibrillation ([facility] 04/23/2018    HTN (hypertension) 09/12/2017    Bicuspid aortic valve     S/P AVR 01/01/2013     Current Outpatient Prescriptions   Medication Sig Dispense Refill    sotalol (BETAPACE) 160 MG tablet Take 160 mg by mouth 2 times daily      aspirin 81 MG tablet Take 81 mg by mouth daily      rivaroxaban (XARELTO) 20 MG TABS tablet Take 1 tablet by mouth Daily with supper 90 tablet 3     No current facility-administered medications for this visit.       Allergies: Pcn [penicillins]  Past Medical History:   Diagnosis Date    Arrhythmia     Atrial fibrillation ([facility]     Bicuspid aortic valve     Status post AVR in 2013    Cancer ([facility]     Basal cell carcinoma on chest    Osteoarthritis     S/P AVR 2013    Porcine     Past Surgical History:   Procedure Laterality Date    AORTIC VALVE REPLACEMENT  09/2013    Porcine valve replacement with fernando-arch replacement    CARDIAC SURGERY  2013    Aortic Valve Replacement    CARDIOVERSION      COLONOSCOPY  2017    DIAGNOSTIC CARDIAC CATH LAB PROCEDURE      JOINT REPLACEMENT Right     knee     Family History   Problem Relation Age of Onset    Heart Disease Mother     Hypertension Father     Heart Attack Father     Breast Cancer Sister     Stroke Sister     Obesity Brother     Breast Cancer Sister      Social History   Substance Use Topics    Smoking status: Never Smoker    Smokeless tobacco: Never Used    Alcohol use Yes          Review of Systems:    General:      Complaint / Symptom Yes / No / Description if Yes       Fatigue No   Weight gain N/A   Insomnia N/A       Respiratory:        Complaint / Symptom Yes / No / Description if Yes       Cough No   Horseness N/A       Cardiovascular:    Complaint / Symptom Yes / No / Description if Yes       Chest Pain No   Shortness of Air / Orthopnea No   Presyncope / Syncope No   Palpitations No         Objective:    /78   Pulse (!) 48   Ht 6' 1\" (1.854 m)   Wt 233 lb (105.7 kg)   BMI 30.74 kg/m²     GENERAL - well developed and well nourished, conversant  HEENT -  PERRLA, Hearing appears normal  NECK - no thyromegaly, no JVD, trachea is in the midline  CARDIOVASCULAR - PMI is in the mid line clavicular position, Normal S1 and S2 with a grade 1/6 systolic murmur. No S3 or S4    PULMONARY - no respiratory distress. No wheezes or rales.  Lungs are clear to ausculation   ABDOMEN  - soft, non tender, no rebound  MUSCULOSKELETAL  - range of motion of the upper and lower extermites appears normal and equal and is without pain   EXTREMITIES - no significant edema   NEUROLOGIC - gait and station are normal  SKIN - turgor is normal  PSYCHIATRIC - normal mood and affect, alert and orientated x 3,      ASSESSMENT:    ALL THE CARDIOLOGY PROBLEMS ARE LISTED ABOVE; HOWEVER, THE FOLLOWING SPECIFIC CARDIAC PROBLEMS / CONDITIONS WERE ADDRESSED AND TREATED DURING THE OFFICE VISIT TODAY:                                                                                            MEDICAL DECISION MAKING             Cardiac Specific Problem / Diagnosis  Discussion and Data Reviewed Diagnostic Procedures Ordered Management Options Selected           1. Atrial Fibrillation  show no change   Review and summation of old records:    Doing well on Sotalol 160 mg BID, patient is unaware of when he goes out of rhythm but is doing well. No Continue current medications:     Yes           2. HTN  show no change   Patient has a history of these risk factors, which are managed medically, and are on current oral therapy I personally addressed, counselled and educated the patient on this problem / risk factor. I will personally continue and manage prescribed medications and monitor the course of the therapy. No Continue current medications:    {Yes           3. Continue current medications:                Discussed with patient. Follow Up Visit Scheduled for:  6 month(s)    I greatly appreciate the opportunity to meet Ivette Dominguez and your confidence in allowing me to participate in his cardiovascular care. [facility] [address] [place] [address] am scribing for and in the presence of RAUL Rios MD,[facility]. Jacey Mcmanus MA    10/16/18 11:05 AM  IRAUL MD, [address] [employer] , personally performed the services described in this documentation as scribed by Jacey Mcmanus in my presence, and it is both accurate and complete. Electronically signed by Dianna Collazo.  Ana Rios MD, [address]    10/16/18 11:06 AM

## 2019-04-19 ENCOUNTER — OFFICE VISIT (OUTPATIENT)
Dept: CARDIOLOGY | Age: 64
End: 2019-04-19
Payer: OTHER GOVERNMENT

## 2019-04-19 VITALS
DIASTOLIC BLOOD PRESSURE: 86 MMHG | HEART RATE: 46 BPM | HEIGHT: 73 IN | SYSTOLIC BLOOD PRESSURE: 126 MMHG | BODY MASS INDEX: 31.01 KG/M2 | WEIGHT: 234 LBS

## 2019-04-19 DIAGNOSIS — Z95.2 S/P AVR: ICD-10-CM

## 2019-04-19 DIAGNOSIS — I10 ESSENTIAL HYPERTENSION: ICD-10-CM

## 2019-04-19 DIAGNOSIS — Z79.01 CHRONIC ANTICOAGULATION: ICD-10-CM

## 2019-04-19 DIAGNOSIS — Q23.1 BICUSPID AORTIC VALVE: ICD-10-CM

## 2019-04-19 DIAGNOSIS — I48.0 PAF (PAROXYSMAL ATRIAL FIBRILLATION) (HCC): Primary | ICD-10-CM

## 2019-04-19 PROCEDURE — 99213 OFFICE O/P EST LOW 20 MIN: CPT | Performed by: NURSE PRACTITIONER

## 2019-04-19 PROCEDURE — 93000 ELECTROCARDIOGRAM COMPLETE: CPT | Performed by: NURSE PRACTITIONER

## 2019-04-19 NOTE — PATIENT INSTRUCTIONS
Continue current medications as prescribed. Continue to follow up with primary care provider for non cardiac medical problems. Call the office with any problems, questions or concerns at [phone]. Follow up as scheduled with your cardiologist. Dr. Randall Graham 6 months. The following educational material has been included in this after visit summary for your review: Life simple 7.     Additional instructions:  Xarelto can increase your risk of bleeding. If you notice blood in urine or stool, bleeding gums, excessive bruising or cough productive of bloody sputum, notify the office. Information on this blood thinner has been included in your after visit summary. Coronary artery disease risk factors you can control: Smoking, high blood pressure, high cholesterol, diabetes, being overweight, lack of exercise and stress. Continue heart healthy diet. Take medications as directed. Exercise as tolerated. Strive for 15 minutes of exercise most days of the week. If asked to keep a blood pressure log, do so for 2 weeks. Call the office to report readings at [phone]. Blood pressure goal  is less than 120/70. Elevated blood pressure at 120-129/80 or less. High blood pressure at 130-139/80-89. If you are taking cholesterol lowering medications, it is recommended that lab work be checked annually. Always keep a current medication list. Bring your medications to every office visit. Life simple 7  1) Manage blood pressure - high blood pressure is a major risk factor for heart disease and stroke. Keeping blood pressure in health range reduces strain on your heart, arteries and kidneys. 2) Control cholesterol - contributes to plaque, which can clog arteries and lead to heart disease and stroke. When you control your cholesterol you are giving your arteries their best chance to remain clear. 3) Reduce blood sugar - most of the food we eat is turning into glucose or blood sugar that our body uses for energy. Over time, high levels of blood sugar can damage your heart, kidneys, eyes and nerves. 4) Get active - living an active life is one of the most rewarding gifts you can give yourself and those you love. Simply put, daily physical activity increases your length and quality of life. 5)  Eat better - A healthy diet is one of your best weapons for fighting cardiovascular disease. When you eat a heart healthy diet, you improve your chances for feeling good and staying healthy for life. 6)  Lose weight - when you shed extra fat an unnecessary pounds, you reduce the burden on your hear, lungs, blood vessels and skeleton. You give yourself the gift of active living, you lower your blood pressure and help yourself feel better. 7) Stop smoking - cigarette smokers have a higher risk of developing cardiovascular disease. If  You smoke, quitting is the best thing you can do for your health. Check American Heart Association on line for more information on Life's Simple 7 and tips for healthy living.       Patient Education        rivaroxaban  Pronunciation:  BRANDO a CHANCE a ban  Brand:  Xarelto  What is the most important information I should know about rivaroxaban? Do not stop taking rivaroxaban without first talking to your doctor. Stopping suddenly can increase your risk of blood clot or stroke. Rivaroxaban can cause you to bleed more easily. Call your doctor at once if you have signs of bleeding such as: headaches, feeling very weak or dizzy, bleeding gums, nosebleeds, heavy menstrual periods or abnormal vaginal bleeding, blood in your urine, bloody or tarry stools, coughing up blood or vomit that looks like coffee grounds. Many other drugs can increase your risk of bleeding when used with rivaroxaban. Tell your doctor about all medicines you have recently used. Rivaroxaban can cause a very serious blood clot around your spinal cord if you undergo a spinal tap or receive spinal anesthesia (epidural).  Tell any doctor who treats you that you are taking rivaroxaban. What is rivaroxaban? Rivaroxaban blocks the activity of certain clotting substances in the blood. Rivaroxaban is used to prevent or treat a type of blood clot called deep vein thrombosis (DVT), which can lead to blood clots in the lungs (pulmonary embolism). A DVT can occur after certain types of surgery. Rivaroxaban is sometimes used to lower your risk of a DVT or PE coming back after you have received treatment for blood clots for at least 6 months. Rivaroxaban is also used in people with atrial fibrillation (a heart rhythm disorder) to lower the risk of stroke caused by a blood clot. Rivaroxaban may also be used for purposes not listed in this medication guide. What should I discuss with my healthcare provider before taking rivaroxaban? You should not use rivaroxaban if you are allergic to it, or if you have active or uncontrolled bleeding. Rivaroxaban can cause a very serious blood clot around your spinal cord if you undergo a spinal tap or receive spinal anesthesia (epidural). This type of blood clot could cause long-term paralysis, and may be more likely to occur if:   · you have a genetic spinal defect;  · you have a spinal catheter in place;  · you have a history of spinal surgery or repeated spinal taps;  · you have recently had a spinal tap or epidural anesthesia;  · you are taking an NSAID--Advil, Aleve, Motrin, and others; or  · you are using other medicines to treat or prevent blood clots. Rivaroxaban may cause you to bleed more easily, especially if you have:  · a bleeding disorder that is inherited or caused by disease;  · hemorrhagic stroke;  · uncontrolled high blood pressure;  · stomach or intestinal bleeding or ulcer; or  · if you take certain medicines such as aspirin, enoxaparin, heparin, warfarin (Coumadin, Cathyann Primer), clopidogrel (Plavix), or certain antidepressants.   Tell your doctor if you have ever had:  · an artificial heart valve; or  · liver or kidney disease. Taking rivaroxaban during pregnancy may cause bleeding in the mother or the unborn baby. Tell your doctor if you are pregnant or plan to become pregnant. It may not be safe to breast-feed a baby while you are using this medicine. Ask your doctor about any risks. How should I take rivaroxaban? Follow all directions on your prescription label and read all medication guides or instruction sheets. Your doctor may occasionally change your dose. Use the medicine exactly as directed. The number of times you take rivaroxaban each day will depend on the reason you are using this medication. For some conditions, rivaroxaban should be taken with food. Whether you take the medicine with or without food may also depend on the tablet strength you take. Follow your doctor's dosing instructions very carefully. Tell your doctor if you have trouble swallowing a rivaroxaban tablet. Tell any doctor who treats you that you are using rivaroxaban. If you need surgery or dental work, tell the surgeon or dentist ahead of time that you are using this medication. If you need anesthesia for a medical procedure or surgery, you may need to stop using rivaroxaban for a short time. Do not change your dose or stop taking this medication without first talking to your doctor. Stopping suddenly can increase your risk of blood clot or stroke. Store at room temperature away from moisture and heat. What happens if I miss a dose? If you take rivaroxaban 1 time each day: Take the medicine as soon as you remember, and then go back to your regular schedule. Do not take two doses at one time. If you take rivaroxaban 2 times each day: Take the missed dose as soon as you remember. You may take 2 doses at the same time to make up a missed dose. Get your prescription refilled before you run out of medicine completely. What happens if I overdose?   Seek emergency medical attention or call the Poison Help line at 1-425.967.1967. Overdose may cause excessive bleeding. What should I avoid while taking rivaroxaban? Avoid activities that may increase your risk of bleeding or injury. Use extra care to prevent bleeding while shaving or brushing your teeth. What are the possible side effects of rivaroxaban? Get emergency medical help if you have signs of an allergic reaction: hives; difficult breathing; swelling of your face, lips, tongue, or throat. Also seek emergency medical attention if you have symptoms of a spinal blood clot: back pain, numbness or muscle weakness in your lower body, or loss of bladder or bowel control. Rivaroxaban can cause you to bleed more easily. Call your doctor at once if you have signs of bleeding such as:  · easy bruising or bleeding (nosebleeds, bleeding gums, heavy menstrual bleeding);  · pain, swelling, or drainage from a wound or where a needle was injected in your skin;  · bleeding from wounds or needle injections, any bleeding that will not stop;  · headaches, dizziness, weakness, feeling like you might pass out;  · urine that looks red, pink, or brown; or  · bloody or tarry stools, coughing up blood or vomit that looks like coffee grounds. Bleeding is the most common side effect of rivaroxaban. This is not a complete list of side effects and others may occur. Call your doctor for medical advice about side effects. You may report side effects to FDA at 2-662-CXR-8542. What other drugs will affect rivaroxaban? Sometimes it is not safe to use certain medications at the same time. Some drugs can affect your blood levels of other drugs you take, which may increase side effects or make the medications less effective. Other drugs may affect rivaroxaban, including prescription and over-the-counter medicines, vitamins, and herbal products. Tell your doctor about all your current medicines and any medicine you start or stop using. Where can I get more information?   Your pharmacist can provide more information about rivaroxaban. Remember, keep this and all other medicines out of the reach of children, never share your medicines with others, and use this medication only for the indication prescribed. Every effort has been made to ensure that the information provided by [address] is accurate, up-to-date, and complete, but no guarantee is made to that effect. Drug information contained herein may be time sensitive. [facility] information has been compiled for use by healthcare practitioners and consumers in the [facility] and therefore [facility] does not warrant that uses outside of the [facility] are appropriate, unless specifically indicated otherwise. [facility]'s drug information does not endorse drugs, diagnose patients or recommend therapy. [facility]'s drug information is an informational resource designed to assist licensed healthcare practitioners in caring for their patients and/or to serve consumers viewing this service as a supplement to, and not a substitute for, the expertise, skill, knowledge and judgment of healthcare practitioners. The absence of a warning for a given drug or drug combination in no way should be construed to indicate that the drug or drug combination is safe, effective or appropriate for any given patient. [facility] does not assume any responsibility for any aspect of healthcare administered with the aid of information [facility] provides. The information contained herein is not intended to cover all possible uses, directions, precautions, warnings, drug interactions, allergic reactions, or adverse effects. If you have questions about the drugs you are taking, check with your doctor, nurse or pharmacist.  Copyright 0485-5048 [address] Version: 5.01. Revision date: 2/26/2018. Care instructions adapted under license by [facility] ([facility]).  If you have questions about a medical condition or this instruction, always ask your healthcare professional. [employer] disclaims any warranty or liability for your use of this information. Patient Education        A Healthy Heart: Care Instructions  Your Care Instructions    Heart disease occurs when a substance called plaque builds up in the vessels that supply oxygen-rich blood to your heart. This can narrow the blood vessels and reduce blood flow. A heart attack happens when blood flow is completely blocked. A high-fat diet, smoking, and other factors increase the risk of heart disease. Your doctor has found that you have a chance of having heart disease. You can do lots of things to keep your heart healthy. It may not be easy, but you can change your diet, exercise more, and quit smoking. These steps really work to lower your chance of heart disease. Follow-up care is a key part of your treatment and safety. Be sure to make and go to all appointments, and call your doctor if you are having problems. It's also a good idea to know your test results and keep a list of the medicines you take. How can you care for yourself at home? Diet    · Use less salt when you cook and eat. This helps lower your blood pressure. Taste food before salting. Add only a little salt when you think you need it. With time, your taste buds will adjust to less salt.     · Eat fewer snack items, fast foods, canned soups, and other high-salt, high-fat, processed foods.     · Read food labels and try to avoid saturated and trans fats. They increase your risk of heart disease by raising cholesterol levels.     · Limit the amount of solid fat-butter, margarine, and shortening-you eat. Use olive, peanut, or canola oil when you cook. Bake, broil, and steam foods instead of frying them.     · Eating fish can lower your risk for heart disease. Eat at least 2 servings of fish a week. Cobb, mackerel, herring, sardines, and chunk light tuna are very good choices.  These fish contain omega-3 fatty acids.     · Eat a variety of fruit and vegetables every day. Dark green, deep orange, red, or yellow fruits and vegetables are especially good for you. Examples include spinach, carrots, peaches, and berries.     · Foods high in fiber can reduce your cholesterol and provide important vitamins and minerals. High-fiber foods include whole-grain cereals and breads, oatmeal, beans, brown rice, citrus fruits, and apples.     · Limit drinks and foods with added sugar. These include candy, desserts, and soda pop.    Lifestyle changes    · If your doctor recommends it, get more exercise. Walking is a good choice. Bit by bit, increase the amount you walk every day. Try for at least 30 minutes on most days of the week. You also may want to swim, bike, or do other activities.     · Do not smoke. If you need help quitting, talk to your doctor about stop-smoking programs and medicines. These can increase your chances of quitting for good. Quitting smoking may be the most important step you can take to protect your heart. It is never too late to quit. You will get health benefits right away.     · Limit alcohol to 2 drinks a day for men and 1 drink a day for women. Too much alcohol can cause health problems. Medicines    · Take your medicines exactly as prescribed. Call your doctor if you think you are having a problem with your medicine.     · If your doctor recommends aspirin, take the amount directed each day. Make sure you take aspirin and not another kind of pain reliever, such as acetaminophen (Tylenol). If you take ibuprofen (such as Advil or Motrin) for other problems, take aspirin at least 2 hours before taking ibuprofen. When should you call for help? Call 911 if you have symptoms of a heart attack.  These may include:    · Chest pain or pressure, or a strange feeling in the chest.     · Sweating.     · Shortness of breath.     · Pain, pressure, or a strange feeling in the back, neck, jaw, or upper belly or in one or both shoulders or arms.     · Lightheadedness or sudden weakness.     · A fast or irregular heartbeat.    After you call 911, the  may tell you to chew 1 adult-strength or 2 to 4 low-dose aspirin. Wait for an ambulance. Do not try to drive yourself.   Watch closely for changes in your health, and be sure to contact your doctor if you have any problems. Where can you learn more? Go to https://[email or web address]. org and sign in to your [employer] account. Enter T285 in the [employer] box to learn more about \"A Healthy Heart: Care Instructions. \"     If you do not have an account, please click on the \"Sign Up Now\" link. Current as of: July 22, 2018  Content Version: 11.9  © 9194-8058 [employer]. Care instructions adapted under license by [facility] ([facility]). If you have questions about a medical condition or this instruction, always ask your healthcare professional. [place] [address] any warranty or liability for your use of this information.

## 2019-04-19 NOTE — PROGRESS NOTES
[facility], [place]. [address], [place] [address], [address]  [phone] office  [phone] fax      OFFICE VISIT:  2019    Neelima Guillen - : 1955    Reason For Visit:  Kelly Muir is a 59 y.o. male who is here for 6 Month Follow-Up (no cardiac symptoms) and Atrial Fibrillation    The patient presents today for cardiology follow up. Overall, the patient is doing well from a cardiac standpoint without symptoms to suggest myocardial ischemia or recurrent AF. Does report some fatigue on Sotalol 160 mg BID. Patient would like to change anti arrhythmic .  BP is well controlled on current regimen. The patient's PCP monitors cholesterol. Subjective  Kelly Muir denies exertional chest pain, shortness of breath, orthopnea, paroxysmal nocturnal dyspnea, syncope, presyncope, sustained arrythmia, edema and fatigue. The patient denies numbness or weakness to suggest cerebrovascular accident or transient ischemic attack.       Ashly Aguayo has the following history as recorded in [facility]:    Patient Active Problem List   Diagnosis Code    HTN (hypertension) I10    Bicuspid aortic valve Q23.1    Persistent atrial fibrillation (HCC) I48.1    S/P AVR Z95.2    PAF (paroxysmal atrial fibrillation) (HCC) I48.0    Regular pulse rhythm Z78.9    History of cardioversion Z98.890    Chronic anticoagulation Z79.01     Past Medical History:   Diagnosis Date    Arrhythmia     Atrial fibrillation ([address]     Bicuspid aortic valve     Status post AVR in     Cancer ([address]     Basal cell carcinoma on chest    Osteoarthritis     S/P AVR     Porcine     Past Surgical History:   Procedure Laterality Date    AORTIC VALVE REPLACEMENT  2013    Porcine valve replacement with fernando-arch replacement    CARDIAC SURGERY  2013    Aortic Valve Replacement    CARDIOVERSION      COLONOSCOPY  2017    DIAGNOSTIC CARDIAC CATH LAB PROCEDURE      JOINT REPLACEMENT Right     knee     Family History   Problem Relation Age of Onset    Heart Disease Mother     Hypertension Father     Heart Attack Father     Breast Cancer Sister     Stroke Sister     Obesity Brother     Breast Cancer Sister      Social History     Tobacco Use    Smoking status: Never Smoker    Smokeless tobacco: Never Used   Substance Use Topics    Alcohol use: Yes      Current Outpatient Medications   Medication Sig Dispense Refill    sotalol (BETAPACE) 160 MG tablet Take 1 tablet by mouth 2 times daily 180 tablet 3    aspirin 81 MG tablet Take 81 mg by mouth daily      rivaroxaban (XARELTO) 20 MG TABS tablet Take 1 tablet by mouth Daily with supper 90 tablet 3     No current facility-administered medications for this visit. Allergies: Pcn [penicillins]    Review of Systems  Constitutional - no appetite change, or unexpected weight change. No fever, chills or diaphoresis. No significant change in activity level or new onset of fatigue. HEENT - no significant rhinorrhea or epistaxis. No tinnitus or significant hearing loss. Eyes - no sudden vision change or amaurosis. No corneal arcus, xantholasma, subconjunctival hemorrhage or discharge. Respiratory - no significant wheezing, stridor, apnea or cough. No dyspnea on exertion or shortness of air. Cardiovascular - no exertional chest pain to suggest myocardial ischemia. No orthopnea or PND. No sensation of sustained arrythmia. No occurrence of slow heart rate. No palpitations. No claudication. No leg edema. Gastrointestinal - no abdominal swelling or pain. No blood in stool. No severe constipation, diarrhea, nausea, or vomiting. Genitourinary - no dysuria, frequency, or urgency. No flank pain or hematuria. Musculoskeletal - no back pain or myalgia. No problems with gait. Extremities - no clubbing, cyanosis or edema. Skin - no color change or rash. No pallor. No new surgical incision.    Neurologic - no speech difficulty, facial asymmetry or lateralizing weakness. No seizures, presyncope or syncope. No significant dizziness. Hematologic - no easy bruising or excessive bleeding. Psychiatric - no severe anxiety or insomnia. No confusion. All other review of systems are negative. Objective  Vital Signs - /86   Pulse (!) 46   Ht 6' 1\" (1.854 m)   Wt 234 lb (106.1 kg)   BMI 30.87 kg/m²   General - Boy Loveless is alert, cooperative, and pleasant. Well groomed. No acute distress. Body habitus - Body mass index is 30.87 kg/m². HEENT - Head is normocephalic. No circumoral cyanosis. Dentition is normal.  EYES -   Lids normal without ptosis. No discharge, edema or subconjunctival hemorrhage. Neck - Symmetrical without apparent mass or lymphadenopathy. Respiratory - Normal respiratory effort without use of accessory muscles. Ausculatation reveals vesicular breath sounds without crackles, wheezes, rub or rhonchi. Cardiovascular - No jugular venous distention. Auscultation reveals regular rate and rhythm. No audible clicks, gallop or rub. No murmur. No lower extremity varicosities. No carotid bruits. Abdominal -  No visible distention, mass or pulsations. Extremities - No clubbing or cyanosis. No statis dermatitis or ulcers. No edema. Musculoskeletal -   No Osler's nodes. No kyphosis or scoliosis. Gait is even and regular without limp or shuffle. Ambulates without assistance. Skin -  Warm and dry; no rash or pallor. No new surgical wound. Neurological - No focal neurological deficits. Thought processes coherent. No apparent tremor. Oriented to person, place and time. Psychiatric -  Appropriate affect and mood. Assessment:     Diagnosis Orders   1. PAF (paroxysmal atrial fibrillation) ([facility]     2. S/P AVR     3. Essential hypertension     4. Bicuspid aortic valve     5. Chronic anticoagulation       Data reviewed:  Findings:   1.  Analysis of the stress and rest images reveals no obvious areas of   ischemia or infarction. 2. Analysis of the gated images reveals grossly normal left   ventricular function with a calculated ejection fraction of 53 %. Conclusions:   Grossly negative Cardiolyte for the presence of ischemia or infarction   with normal wall motion and ejection fraction at rest.   Signed by Dr Morales Darling on 8/10/2018 5:33 AM     9/25/17 echo conclusions    Summary   Normal left ventricular size with preserved LV function and an estimated   ejection fraction of approximately 55-60%.   No evidence of left ventricular mass or thrombus noted.    Signature    ----------------------------------------------------------------   Electronically signed by Morales Darling MD(Interpreting   physician) on 09/25/2017 04:44 PM   ----------------------------------------------------------------    Findings    Mitral Valve   Mitral valve leaflets are mildly thickened with preserved leaflet   mobility.   Mild mitral regurgitation is present.      Aortic Valve   Prior tissue aortic valve replacement   Mildly thickened aortic valve leaflets with preserved leaflet mobility.   Trivial aortic regurgitation is noted.   The aortic valve area is 1.6 cm2 with a maximum gradient of 21 mmHg and a   mean gradient of 10 mmHg.      Tricuspid Valve   Trivial tricuspid regurgitation with estimated RVSP of 37 mm Hg.      Pulmonic Valve   Mild pulmonic regurgitation present.      Left Atrium   Mildly dilated left atrium.      Left Ventricle   Normal left ventricular size with preserved LV function and an estimated   ejection fraction of approximately 55-60%.   No evidence of left ventricular mass or thrombus noted.      Right Atrium   Mildly enlarged right atrium size.      Pericardial Effusion   No evidence of significant pericardial effusion is noted. EKG reviewed: NSR 46 bpm; no acute ischemic changes; QTc .458. Stable CV status without symptoms of overt heart failure, arrhythmia or angina.     Some fatigue on Sotalol. Plan to consult Dr. Raquel Urrutia about possible change in anti arrhythmic. BP well controlled. No bleeding issues on Xarelto. Patient is compliant with medication regimen. BP Readings from Last 3 Encounters:   04/19/19 126/86   10/16/18 138/78   07/24/18 112/62    Pulse Readings from Last 3 Encounters:   04/19/19 (!) 46   10/16/18 (!) 48   07/24/18 (!) 48        Wt Readings from Last 3 Encounters:   04/19/19 234 lb (106.1 kg)   10/16/18 233 lb (105.7 kg)   07/24/18 226 lb (102.5 kg)     Plan  Previous cardiac history and records reviewed. Continue current medications as prescribed. Continue to follow up with primary care provider for non cardiac medical problems. Call the office with any problems, questions or concerns at [phone]. Follow up as scheduled with your cardiologist. Dr. Raquel Urrutia 6 months. The following educational material has been included in this after visit summary for your review: Life simple 7.     Additional instructions:  Xarelto can increase your risk of bleeding. If you notice blood in urine or stool, bleeding gums, excessive bruising or cough productive of bloody sputum, notify the office. Information on this blood thinner has been included in your after visit summary. Coronary artery disease risk factors you can control: Smoking, high blood pressure, high cholesterol, diabetes, being overweight, lack of exercise and stress. Continue heart healthy diet. Take medications as directed. Exercise as tolerated. Strive for 15 minutes of exercise most days of the week. If asked to keep a blood pressure log, do so for 2 weeks. Call the office to report readings at [phone]. Blood pressure goal  is less than 120/70. Elevated blood pressure at 120-129/80 or less. High blood pressure at 130-139/80-89. If you are taking cholesterol lowering medications, it is recommended that lab work be checked annually.   Always keep a current medication list. Bring your medications to every office visit. Life simple 7  1) Manage blood pressure - high blood pressure is a major risk factor for heart disease and stroke. Keeping blood pressure in health range reduces strain on your heart, arteries and kidneys. 2) Control cholesterol - contributes to plaque, which can clog arteries and lead to heart disease and stroke. When you control your cholesterol you are giving your arteries their best chance to remain clear. 3) Reduce blood sugar - most of the food we eat is turning into glucose or blood sugar that our body uses for energy. Over time, high levels of blood sugar can damage your heart, kidneys, eyes and nerves. 4) Get active - living an active life is one of the most rewarding gifts you can give yourself and those you love. Simply put, daily physical activity increases your length and quality of life. 5)  Eat better - A healthy diet is one of your best weapons for fighting cardiovascular disease. When you eat a heart healthy diet, you improve your chances for feeling good and staying healthy for life. 6)  Lose weight - when you shed extra fat an unnecessary pounds, you reduce the burden on your hear, lungs, blood vessels and skeleton. You give yourself the gift of active living, you lower your blood pressure and help yourself feel better. 7) Stop smoking - cigarette smokers have a higher risk of developing cardiovascular disease. If  You smoke, quitting is the best thing you can do for your health. Check American Heart Association on line for more information on Life's Simple 7 and tips for healthy living.      ANNA Cuevas

## 2019-05-11 DIAGNOSIS — I48.19 PERSISTENT ATRIAL FIBRILLATION (HCC): ICD-10-CM

## 2019-05-13 RX ORDER — RIVAROXABAN 20 MG/1
TABLET, FILM COATED ORAL
Qty: 90 TABLET | Refills: 3 | Status: SHIPPED | OUTPATIENT
Start: 2019-05-13 | End: 2019-10-17 | Stop reason: SDUPTHER

## 2019-09-06 RX ORDER — SOTALOL HYDROCHLORIDE 160 MG/1
TABLET ORAL
Qty: 180 TABLET | Refills: 4 | Status: SHIPPED | OUTPATIENT
Start: 2019-09-06 | End: 2020-06-23 | Stop reason: SDUPTHER

## 2019-10-17 ENCOUNTER — OFFICE VISIT (OUTPATIENT)
Dept: CARDIOLOGY | Age: 64
End: 2019-10-17
Payer: OTHER GOVERNMENT

## 2019-10-17 VITALS
HEIGHT: 73 IN | SYSTOLIC BLOOD PRESSURE: 122 MMHG | WEIGHT: 230 LBS | HEART RATE: 48 BPM | DIASTOLIC BLOOD PRESSURE: 74 MMHG | BODY MASS INDEX: 30.48 KG/M2

## 2019-10-17 DIAGNOSIS — I10 ESSENTIAL HYPERTENSION: ICD-10-CM

## 2019-10-17 DIAGNOSIS — Z79.01 CHRONIC ANTICOAGULATION: ICD-10-CM

## 2019-10-17 DIAGNOSIS — I48.0 PAF (PAROXYSMAL ATRIAL FIBRILLATION) (HCC): Primary | ICD-10-CM

## 2019-10-17 DIAGNOSIS — Z95.2 S/P AVR: ICD-10-CM

## 2019-10-17 PROCEDURE — 93000 ELECTROCARDIOGRAM COMPLETE: CPT | Performed by: NURSE PRACTITIONER

## 2019-10-17 PROCEDURE — 99213 OFFICE O/P EST LOW 20 MIN: CPT | Performed by: NURSE PRACTITIONER

## 2020-03-25 ENCOUNTER — TELEPHONE (OUTPATIENT)
Dept: CARDIOLOGY | Age: 65
End: 2020-03-25
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
## 2020-03-25 NOTE — TELEPHONE ENCOUNTER
Patient notified and he voiced understanding. He doesn't feel like he needs a monitor at this time. He isn't having any chest pain or any other symptoms. Just feels like his breast bone is sore on the inside. He will call if he has any new symptoms or symptoms worsen.

## 2020-06-23 ENCOUNTER — OFFICE VISIT (OUTPATIENT)
Dept: CARDIOLOGY | Age: 65
End: 2020-06-23
Payer: OTHER GOVERNMENT

## 2020-06-23 VITALS
HEIGHT: 73 IN | SYSTOLIC BLOOD PRESSURE: 130 MMHG | BODY MASS INDEX: 31.81 KG/M2 | WEIGHT: 240 LBS | DIASTOLIC BLOOD PRESSURE: 80 MMHG | HEART RATE: 45 BPM

## 2020-06-23 PROCEDURE — 99213 OFFICE O/P EST LOW 20 MIN: CPT | Performed by: CLINICAL NURSE SPECIALIST

## 2020-06-23 PROCEDURE — 93000 ELECTROCARDIOGRAM COMPLETE: CPT | Performed by: CLINICAL NURSE SPECIALIST

## 2020-06-23 RX ORDER — SOTALOL HYDROCHLORIDE 160 MG/1
160 TABLET ORAL 2 TIMES DAILY
Qty: 180 TABLET | Refills: 3 | Status: SHIPPED | OUTPATIENT
Start: 2020-06-23

## 2020-06-23 NOTE — PATIENT INSTRUCTIONS
Follow up in 6-9 mos With Dr. Bobbie Bloch   Call with any questions or concerns  Follow up with Emily Torres MD for non cardiac problems  Report any new problems  Cardiovascular Fitness-Exercise as tolerated. Strive for 30 minutes of exercise most days of the week. Cardiac / Healthy Diet  Continue current medications as directed  Continue plan of treatment  It is always recommended that you bring your medications bottles with you to each visit - this is for your safety!

## 2020-06-23 NOTE — PROGRESS NOTES
No seizures, presyncope, syncope, or significant dizziness. Hematologic - no easy bruising or excessive bleeding. Psychiatric - no severe anxiety or insomnia. No confusion. All other review of systems are negative. Objective  Vital Signs - /80   Pulse (!) 45   Ht 6' 1\" (1.854 m)   Wt 240 lb (108.9 kg)   BMI 31.66 kg/m²   General - Shan Solis is alert, cooperative, and pleasant. Well groomed. No acute distress. Body habitus is overweight. HEENT - The head is normocephalic. No circumoral cyanosis. Dentition is normal.   EYES -  No Xanthelasma, no arcus senilis, no conjunctival hemorrhages or discharge. Neck - Supple, without increased jugular venous pressures. No carotid bruits. No mass. Respiratory - Lungs are clear bilaterally. No wheezes or rales. Normal effort without use of accessory muscles. Cardiovascular - Heart has regular rhythm and bradycardic rate. No murmurs, rubs or gallops. + pedal pulses and no varicosities. Abdominal -  Soft, nontender, nondistended. Bowel sounds are intact. Extremities - No clubbing, cyanosis, or  edema. Musculoskeletal -  No clubbing . No Osler's nodes. Gait normal .  No kyphosis or scoliosis. Skin -  no statis ulcers or dermatitis. Neurological - No focal signs are identified. Oriented to person, place and time. Psychiatric -  Appropriate affect and mood. Assessment:     Diagnosis Orders   1. PAF (paroxysmal atrial fibrillation) ([facility])  EKG 12 lead   2. S/P AVR     3. Essential hypertension       Data:  BP Readings from Last 3 Encounters:   06/23/20 130/80   10/17/19 122/74   04/19/19 126/86    Pulse Readings from Last 3 Encounters:   06/23/20 (!) 45   10/17/19 (!) 48   04/19/19 (!) 46        Wt Readings from Last 3 Encounters:   06/23/20 240 lb (108.9 kg)   10/17/19 230 lb (104.3 kg)   04/19/19 234 lb (106.1 kg)     EKG today shows bradycardia with a rate of 46. QTc 484 ms  Pressure and heart rate well controlled. Rhythm controlled on sotalol 160 mg twice a day. Remains anticoagulated on Xarelto  Active and still working. No change in activity tolerance. We discussed should he have any recurrent sustained atrial fibrillation to let us know. He can [facility]   States taking medications as prescribed  Stable cardiovascular status. No evidence of overt heart failure, angina or dysrhythmia. Plan    Follow up in 6-9 mos With Dr. Barron Area   Call with any questions or concerns  Follow up with Brenda Patricio MD for non cardiac problems  Report any new problems  Cardiovascular Fitness-Exercise as tolerated. Strive for 30 minutes of exercise most days of the week. Cardiac / Healthy Diet  Continue current medications as directed  Continue plan of treatment  It is always recommended that you bring your medications bottles with you to each visit - this is for your safety! ANNA Hartmann    EMR dragon/transcription disclaimer: Much of this encounter note is electronic transcription/translation of spoken language to printed tach. Electronic translation of spoken language may be erroneous, or at times, nonsensical words or phrases may be inadvertently transcribed.  Although, I have reviewed the note for such errors, some may still exist.

## 2024-06-21 ENCOUNTER — TELEPHONE (OUTPATIENT)
Dept: NEUROSURGERY | Age: 69
End: 2024-06-21

## 2024-06-21 NOTE — TELEPHONE ENCOUNTER
[facility] New Patient Questionnaire    Diagnosis/Reason for Referral?     DX: G93.0 (ICD-10-CM) - Cerebral cysts     2. Who is completing questionnaire?      Patient X Caregiver Family      3. Has the patient had any previous spinal/brain surgeries?     NO      A. If yes, what is the name of the facility in which the surgery was performed?       B. Procedure/Surgery performed?       C. Who was the surgeon?       D. When was the surgery?    MM/YY       E. Did the patient improve after the surgery?        4. Is this a second opinion?   If yes, Dr. Valdes would like to review patient first before making the appointment.      5. Have MRI Images been obtain within the last year?     Yes  No      XR  CT X     If yes, where was the imaging performed?     [facility]   If yes, what part of the body?     Lumbar  Cervical  Thoracic  Brain X     If yes, when was it obtained?      6/18/2024    Note: if the scan was performed at a facility other than [facility], the disc will need to be brought to the appointment or we need to reach out to obtain the disc.     A. Was the patient instructed to provide the disc?      Yes X  No      8. Has the patient had a NCV/EMG within the last year?      Yes  No X     If yes, where was it performed and date?      MM/YY  Location:      9. Has the patient been to Physical Therapy?      Yes  No X     If yes, what location, how long attended, and last visit?    Location:        Therapy Lasted:    Date of Last Visit:      10. Has the patient been to Pain Management?     Yes  No X     If yes, what location and last visit     Location:   Last Visit:   Is it helping?

## 2024-07-02 ENCOUNTER — TELEPHONE (OUTPATIENT)
Dept: NEUROLOGY | Age: 69
End: 2024-07-02

## 2024-07-02 NOTE — TELEPHONE ENCOUNTER
The pt wife called and stated that the pt had an episode with vertigo.  They would like to be seen sooner.  PSC unable to schedule any sooner.  Please review.

## 2024-07-09 NOTE — TELEPHONE ENCOUNTER
Called pt no answer left voicemail explaining we do not have anything sooner at this moment. Added to the wait list

## 2024-08-05 ENCOUNTER — OFFICE VISIT (OUTPATIENT)
Dept: NEUROLOGY | Age: 69
End: 2024-08-05
Payer: MEDICARE

## 2024-08-05 VITALS
HEIGHT: 73 IN | BODY MASS INDEX: 28.89 KG/M2 | HEART RATE: 74 BPM | DIASTOLIC BLOOD PRESSURE: 82 MMHG | SYSTOLIC BLOOD PRESSURE: 156 MMHG | WEIGHT: 218 LBS

## 2024-08-05 DIAGNOSIS — H53.121 TRANSIENT VISUAL LOSS OF RIGHT EYE: ICD-10-CM

## 2024-08-05 DIAGNOSIS — H53.121 TRANSIENT VISUAL LOSS OF RIGHT EYE: Primary | ICD-10-CM

## 2024-08-05 LAB
BASOPHILS # BLD: 0 K/UL (ref 0–0.2)
BASOPHILS NFR BLD: 0.6 % (ref 0–1)
CRP SERPL HS-MCNC: <0.3 MG/DL (ref 0–0.5)
EOSINOPHIL # BLD: 0.1 K/UL (ref 0–0.6)
EOSINOPHIL NFR BLD: 2.4 % (ref 0–5)
ERYTHROCYTE [DISTWIDTH] IN BLOOD BY AUTOMATED COUNT: 12.7 % (ref 11.5–14.5)
ERYTHROCYTE [SEDIMENTATION RATE] IN BLOOD BY WESTERGREN METHOD: 4 MM/HR (ref 0–15)
HCT VFR BLD AUTO: 45.9 % (ref 42–52)
HGB BLD-MCNC: 15.3 G/DL (ref 14–18)
IMM GRANULOCYTES # BLD: 0 K/UL
LYMPHOCYTES # BLD: 1.9 K/UL (ref 1.1–4.5)
LYMPHOCYTES NFR BLD: 39.1 % (ref 20–40)
MCH RBC QN AUTO: 31.1 PG (ref 27–31)
MCHC RBC AUTO-ENTMCNC: 33.3 G/DL (ref 33–37)
MCV RBC AUTO: 93.3 FL (ref 80–94)
MONOCYTES # BLD: 0.4 K/UL (ref 0–0.9)
MONOCYTES NFR BLD: 8.7 % (ref 0–10)
NEUTROPHILS # BLD: 2.4 K/UL (ref 1.5–7.5)
NEUTS SEG NFR BLD: 48.8 % (ref 50–65)
PLATELET # BLD AUTO: 201 K/UL (ref 130–400)
PMV BLD AUTO: 9.7 FL (ref 9.4–12.4)
RBC # BLD AUTO: 4.92 M/UL (ref 4.7–6.1)
WBC # BLD AUTO: 4.9 K/UL (ref 4.8–10.8)

## 2024-08-05 PROCEDURE — G8419 CALC BMI OUT NRM PARAM NOF/U: HCPCS | Performed by: NURSE PRACTITIONER

## 2024-08-05 PROCEDURE — 1123F ACP DISCUSS/DSCN MKR DOCD: CPT | Performed by: NURSE PRACTITIONER

## 2024-08-05 PROCEDURE — 99204 OFFICE O/P NEW MOD 45 MIN: CPT | Performed by: NURSE PRACTITIONER

## 2024-08-05 PROCEDURE — 3077F SYST BP >= 140 MM HG: CPT | Performed by: NURSE PRACTITIONER

## 2024-08-05 PROCEDURE — 3079F DIAST BP 80-89 MM HG: CPT | Performed by: NURSE PRACTITIONER

## 2024-08-05 PROCEDURE — 1036F TOBACCO NON-USER: CPT | Performed by: NURSE PRACTITIONER

## 2024-08-05 PROCEDURE — 3017F COLORECTAL CA SCREEN DOC REV: CPT | Performed by: NURSE PRACTITIONER

## 2024-08-05 PROCEDURE — G8427 DOCREV CUR MEDS BY ELIG CLIN: HCPCS | Performed by: NURSE PRACTITIONER

## 2024-08-05 NOTE — PROGRESS NOTES
[facility] Office Note      Patient:   Jatinder Guillen  MR#:    [id number]  Account Number:                         YOB: 1955  Date of Evaluation:  8/5/2024  Time of Note:                          10:34 AM  Primary/Referring Physician:  Haile Agudelo MD   Consulting Physician:  ANNA Doll DNP    NEW PATIENT CONSULTATION    Chief Complaint   Patient presents with    New Patient     Referral for Possible TIA on June 18  Vertigo & Double vision July 2nd  Frontal Headache July 30th   PT states no other symptoms or concerns      HISTORY OF PRESENT ILLNESS    Jatinder Guillen is a 69 y.o. year old male here for evaluation of right sided visual changes. He noted a sudden onset of right sided visual changes that last for about 3-4 minutes and then slowly resolved. He describes the visual change as a shade coming down over his eye. Denies left sided visual complaints. No clear focal symptoms. No facial drooping. No dysarthria, dysphagia. Denies headache or temporal artery tenderness. No jaw claudication. He had an episode of severe dizziness, diaphoresis about 2 weeks following this. Hugo that the room was spinning around him. Had difficulty with balance. Dizziness lasted for several minutes and then resolved. He does have a history of vertigo. He did have a frontal headache last week, brief in nature. He has a history of CABG, valve replacement. He has a history of atrial fibrillation, paroxysmal. S/p ablation. He is on Xarelto. No history of carotid artery stenosis, has not had imaging. No HTN, HLD history. Not a diabetic.     Past Medical History:   Diagnosis Date    Arrhythmia     Atrial fibrillation (HCC)     Bicuspid aortic valve     Status post AVR in 2013    Cancer (HCC)     Basal cell carcinoma on chest    Osteoarthritis     S/P AVR 2013    Porcine       Past Surgical History:   Procedure Laterality Date    AORTIC VALVE REPLACEMENT  09/2013    Porcine valve replacement with

## 2024-08-08 ENCOUNTER — HOSPITAL ENCOUNTER (OUTPATIENT)
Dept: VASCULAR LAB | Age: 69
Discharge: HOME | End: 2024-08-08
Payer: MEDICARE

## 2024-08-08 DIAGNOSIS — H53.121 TRANSIENT VISUAL LOSS OF RIGHT EYE: ICD-10-CM

## 2024-08-08 PROCEDURE — 93880 EXTRACRANIAL BILAT STUDY: CPT

## 2024-08-10 LAB
VAS LEFT CCA MID EDV: 15.4 CM/S
VAS LEFT CCA MID PSV: 84.5 CM/S
VAS LEFT CCA PROX EDV: 10.4 CM/S
VAS LEFT CCA PROX PSV: 92.2 CM/S
VAS LEFT ECA EDV: 9.33 CM/S
VAS LEFT ECA PSV: 84.5 CM/S
VAS LEFT ICA DIST EDV: 34.8 CM/S
VAS LEFT ICA DIST PSV: 119 CM/S
VAS LEFT ICA MID EDV: 21.4 CM/S
VAS LEFT ICA MID PSV: 77.9 CM/S
VAS LEFT ICA PROX EDV: 15.8 CM/S
VAS LEFT ICA PROX PSV: 67.2 CM/S
VAS LEFT VERTEBRAL EDV: 15.3 CM/S
VAS LEFT VERTEBRAL PSV: 45.1 CM/S
VAS RIGHT CCA MID EDV: 7.13 CM/S
VAS RIGHT CCA MID PSV: 78.5 CM/S
VAS RIGHT CCA PROX EDV: 9.3 CM/S
VAS RIGHT CCA PROX PSV: 80.7 CM/S
VAS RIGHT ECA EDV: 6.59 CM/S
VAS RIGHT ECA PSV: 82.3 CM/S
VAS RIGHT ICA DIST EDV: 26.7 CM/S
VAS RIGHT ICA DIST PSV: 109 CM/S
VAS RIGHT ICA MID EDV: 20.9 CM/S
VAS RIGHT ICA MID PSV: 90 CM/S
VAS RIGHT ICA PROX EDV: 16.5 CM/S
VAS RIGHT ICA PROX PSV: 64.8 CM/S
VAS RIGHT VERTEBRAL EDV: 13.7 CM/S
VAS RIGHT VERTEBRAL PSV: 70.8 CM/S

## 2024-08-25 ENCOUNTER — HOSPITAL ENCOUNTER (OUTPATIENT)
Dept: MRI IMAGING | Age: 69
Discharge: HOME | End: 2024-08-25
Payer: MEDICARE

## 2024-08-25 DIAGNOSIS — H53.121 TRANSIENT VISUAL LOSS OF RIGHT EYE: ICD-10-CM

## 2024-08-25 PROCEDURE — 6360000004 HC RX CONTRAST MEDICATION: Performed by: NURSE PRACTITIONER

## 2024-08-25 PROCEDURE — 70553 MRI BRAIN STEM W/O & W/DYE: CPT

## 2024-08-25 PROCEDURE — 70544 MR ANGIOGRAPHY HEAD W/O DYE: CPT

## 2024-08-25 PROCEDURE — A9577 INJ MULTIHANCE: HCPCS | Performed by: NURSE PRACTITIONER

## 2024-08-25 RX ADMIN — GADOBENATE DIMEGLUMINE 20 ML: 529 INJECTION, SOLUTION INTRAVENOUS at 15:19
